# Patient Record
Sex: MALE | Race: BLACK OR AFRICAN AMERICAN | Employment: UNEMPLOYED | URBAN - METROPOLITAN AREA
[De-identification: names, ages, dates, MRNs, and addresses within clinical notes are randomized per-mention and may not be internally consistent; named-entity substitution may affect disease eponyms.]

---

## 2017-03-16 ENCOUNTER — HOSPITAL ENCOUNTER (EMERGENCY)
Age: 22
Discharge: HOME | End: 2017-03-16
Attending: EMERGENCY MEDICINE
Payer: MEDICAID

## 2017-03-16 ENCOUNTER — APPOINTMENT (OUTPATIENT)
Dept: CT IMAGING | Age: 22
End: 2017-03-16
Attending: EMERGENCY MEDICINE
Payer: MEDICAID

## 2017-03-16 VITALS
BODY MASS INDEX: 21.66 KG/M2 | WEIGHT: 110.89 LBS | TEMPERATURE: 97.9 F | SYSTOLIC BLOOD PRESSURE: 131 MMHG | OXYGEN SATURATION: 98 % | HEART RATE: 71 BPM | DIASTOLIC BLOOD PRESSURE: 89 MMHG | RESPIRATION RATE: 18 BRPM

## 2017-03-16 DIAGNOSIS — S00.83XA TRAUMATIC HEMATOMA OF FOREHEAD, INITIAL ENCOUNTER: Primary | ICD-10-CM

## 2017-03-16 DIAGNOSIS — S09.90XA MINOR HEAD INJURY, INITIAL ENCOUNTER: ICD-10-CM

## 2017-03-16 DIAGNOSIS — Q93.4 CRI-DU-CHAT SYNDROME: ICD-10-CM

## 2017-03-16 PROCEDURE — 74011250637 HC RX REV CODE- 250/637: Performed by: EMERGENCY MEDICINE

## 2017-03-16 PROCEDURE — 99283 EMERGENCY DEPT VISIT LOW MDM: CPT

## 2017-03-16 PROCEDURE — 70450 CT HEAD/BRAIN W/O DYE: CPT

## 2017-03-16 RX ORDER — ACETAMINOPHEN 160 MG/5ML
500 LIQUID ORAL
Qty: 1 BOTTLE | Refills: 0 | Status: SHIPPED | OUTPATIENT
Start: 2017-03-16 | End: 2017-05-08

## 2017-03-16 RX ORDER — ACETAMINOPHEN 325 MG/1
650 TABLET ORAL
Status: DISCONTINUED | OUTPATIENT
Start: 2017-03-16 | End: 2017-03-16

## 2017-03-16 RX ORDER — LORAZEPAM 2 MG/ML
1 INJECTION INTRAMUSCULAR
Status: DISCONTINUED | OUTPATIENT
Start: 2017-03-16 | End: 2017-03-16

## 2017-03-16 RX ADMIN — ACETAMINOPHEN 650 MG: 160 SOLUTION ORAL at 09:08

## 2017-03-16 NOTE — DISCHARGE INSTRUCTIONS
Your brother was seen after a head injury. After observation and imaging, we determined this is likely due to a concussion. Make sure to follow up with a pediatrician in 2-3 days to make sure improving. If any fevers, severe headaches, vomiting, changes in mental status, return to ER immediately.

## 2017-03-16 NOTE — ED NOTES
Pt ambulated to exam room with his sister, forehead noted to have swelling and redness, fell in bathtub yesterday hit head, rt eyebrow laceration. Sister says he's acting differently, not as active.

## 2017-03-16 NOTE — ED PROVIDER NOTES
HPI Comments: Georgia Holloway is a 25 y.o. male with PMHx significant for Cri-Du-Chat Syndrome who presents ambulatory to ED [facility] ED for evaluation of small hematoma after GLF. Sister states she was bathing him yesterday morning when he had a witnessed fall in the shower. She says pt fell face forward but saw no immediate trauma. Per sister pt developed a small \"bump\" on the left side of his face last night. Pt was not give Tylenol or ibuprofen for his symptoms. Pt's gaurdian denies any vomiting, diarrhea, fever, chills, or any other acute symptoms. PCP: Kathy Sandoval MD    Social Hx: - tobacco (-), -EtOH (-), - illicit drugs    There are no other complaints, changes or physical findings at this time. The history is provided by a relative. No  was used. 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 Social History Main Topics    Smoking status: Never Smoker    Smokeless tobacco: Never Used    Alcohol use No    Drug use: No    Sexual activity: No     Other Topics Concern    Not on file     Social History Narrative    ** Merged History Encounter **              ALLERGIES: Milk and Pork derived (porcine)    Review of Systems   Reason unable to perform ROS: Cri-du-chat syndrome.        Vitals:    03/16/17 0830   BP: 131/89   Pulse: 71   Resp: 18   Temp: 97.9 °F (36.6 °C)   SpO2: 98%   Weight: 50.3 kg (110 lb 14.3 oz)            Physical Exam Constitutional: He appears well-developed and well-nourished. Features of Cri-Du-Chat syndrome. Pt is ambulating without difficulty. HENT:   Head: Normocephalic and atraumatic. Hematoma slightly left to the midline of head, no periorbital swelling. Eyes: Conjunctivae and EOM are normal. Pupils are equal, round, and reactive to light. Neck: Normal range of motion. Neck supple. Cardiovascular: Normal rate and regular rhythm. Pulmonary/Chest: Effort normal and breath sounds normal. No respiratory distress. Abdominal: Soft. He exhibits no distension. There is no tenderness. Musculoskeletal: Normal range of motion. Neurological: He is alert. Pt is alert but not oriented per baseline. Moving all 4 extremities spontaneously, 5/5 strength bilateral upper and lower extremities. Skin: Skin is warm and dry. Nursing note and vitals reviewed. MDM  Number of Diagnoses or Management Options  Cri-du-chat syndrome:   Minor head injury, initial encounter:   Traumatic hematoma of forehead, initial encounter:   Diagnosis management comments: Patient was seen after a head injury with no loss of consciousness or amnestic events. DDx: contusion, concussion, traumatic bleed, skull fracture. Pt is unable to verbalize symptoms but is having a HA and changes in mental status per sister so will get CT head. Concern for possible fx       Amount and/or Complexity of Data Reviewed  Tests in the radiology section of CPT®: ordered and reviewed  Review and summarize past medical records: yes    Patient Progress  Patient progress: stable    ED Course       Procedures    IMAGING RESULTS:  CT HEAD WO CONT   Final Result   Study Result      EXAM: CT HEAD WO CONT     INDICATION: head injury     COMPARISON: None.     TECHNIQUE: Unenhanced CT of the head was performed using 5 mm images. Brain and  bone windows were generated.  CT dose reduction was achieved through use of a  standardized protocol tailored for this examination and automatic exposure  control for dose modulation.      FINDINGS:  Study is mildly compromised by motion. The ventricles and sulci are within  normal limits. No hemorrhage mass or acute infarction is identified. Bony  structures appear intact. .     IMPRESSION  IMPRESSION: No acute abnormality is identified            MEDICATIONS GIVEN:  Medications   acetaminophen (TYLENOL) solution 650 mg (650 mg Oral Given 3/16/17 0908)       IMPRESSION:  1. Traumatic hematoma of forehead, initial encounter    2. Minor head injury, initial encounter    3. Cri-du-chat syndrome        PLAN:  1. Discharge Medication List as of 3/16/2017 10:02 AM      START taking these medications    Details   acetaminophen (TYLENOL) 160 mg/5 mL liquid Take 15.6 mL by mouth every six (6) hours as needed for Pain., Normal, Disp-1 Bottle, R-0           2. Follow-up Information     Follow up With Details Comments Cameron Nj MD   [address] [place] [address]  [phone]          Return to ED if worse     DISCHARGE NOTE  10:08 AM  The patient has been re-evaluated and is ready for discharge. Reviewed available results with patient. Counseled pt on diagnosis and care plan. Pt has expressed understanding, and all questions have been answered. Pt agrees with plan and agrees to F/U as recommended, or return to the ED if their sxs worsen. Discharge instructions have been provided and explained to the pt, along with reasons to return to the ED. This note is prepared by José Miguel Choudhary acting as Scribe for Rodrick Feliciano M.D. Rodrick Feliciano M.D : The scribe's documentation has been prepared under my direction and personally reviewed by me in its entirety. I confirm that the note above accurately reflects all work, treatment, procedures, and medical decision making performed by me.

## 2017-03-21 ENCOUNTER — OFFICE VISIT (OUTPATIENT)
Dept: PEDIATRICS CLINIC | Age: 22
End: 2017-03-21

## 2017-03-21 VITALS
DIASTOLIC BLOOD PRESSURE: 84 MMHG | WEIGHT: 103.6 LBS | BODY MASS INDEX: 20.34 KG/M2 | HEIGHT: 60 IN | TEMPERATURE: 97 F | HEART RATE: 73 BPM | SYSTOLIC BLOOD PRESSURE: 113 MMHG

## 2017-03-21 DIAGNOSIS — L20.9 ATOPIC DERMATITIS, UNSPECIFIED TYPE: ICD-10-CM

## 2017-03-21 DIAGNOSIS — B35.0 TINEA CAPITIS: Primary | ICD-10-CM

## 2017-03-21 DIAGNOSIS — L01.00 IMPETIGO: ICD-10-CM

## 2017-03-21 RX ORDER — PETROLATUM 42 G/100G
OINTMENT TOPICAL AS NEEDED
Qty: 452 G | Refills: 2 | Status: SHIPPED | OUTPATIENT
Start: 2017-03-21 | End: 2017-05-08

## 2017-03-21 RX ORDER — FLUTICASONE PROPIONATE 0.5 MG/G
CREAM TOPICAL 2 TIMES DAILY
Qty: 30 G | Refills: 2 | Status: SHIPPED | OUTPATIENT
Start: 2017-03-21 | End: 2017-05-08

## 2017-03-21 RX ORDER — GRISEOFULVIN (MICROSIZE) 125 MG/5ML
250 SUSPENSION ORAL 2 TIMES DAILY
Qty: 400 ML | Refills: 0 | Status: SHIPPED | OUTPATIENT
Start: 2017-03-21 | End: 2017-04-10

## 2017-03-21 RX ORDER — MUPIROCIN 20 MG/G
OINTMENT TOPICAL 2 TIMES DAILY
Qty: 30 G | Refills: 1 | Status: SHIPPED | OUTPATIENT
Start: 2017-03-21 | End: 2017-03-31

## 2017-03-21 NOTE — MR AVS SNAPSHOT
Visit Information Date & Time Provider Department Dept. Phone Encounter #  
 3/21/2017 11:00 AM MELISA Aaronkarmen 14 [id number] Upcoming Health Maintenance Date Due Hepatitis A Peds Age 1-18 (1 of 2 - Standard Series) 6/6/1998 HPV AGE 9Y-26Y (1 of 3 - Male 3 Dose Series) 6/6/2008 INFLUENZA AGE 9 TO ADULT 8/1/2016 DTaP/Tdap/Td series (5 - Td) 2/17/2017 Allergies as of 3/21/2017  Review Complete On: 3/21/2017 By: Bert Harrison MD  
  
 Severity Noted Reaction Type Reactions Milk  06/17/2015    Other (comments) Mother states that he has a reaction to dairy, diarrhea, gas, and vomiting. Pork Derived (Porcine)  06/17/2015    Other (comments) Mother states that he has a reaction to pork including diarrhea, gas, and vomiting. Current Immunizations  Reviewed on 6/19/2015 Name Date DTaP 7/15/1998, 1/15/1998, 1997 Hep B Vaccine 3/11/1998, 1997, 1997 Hib 11/15/1998, 3/19/1998, 1/15/1998, 1997 MMR 12/29/2003, 6/25/1998 Meningococcal (MCV4P) Vaccine 6/19/2015 Poliovirus vaccine 2/19/2009, 3/19/1998, 1/5/1998, 1997 Tdap 2/17/2007 Varicella Virus Vaccine 11/15/1998 Not reviewed this visit You Were Diagnosed With   
  
 Codes Comments Tinea capitis    -  Primary ICD-10-CM: B35.0 ICD-9-CM: 110.0 Impetigo     ICD-10-CM: L01.00 ICD-9-CM: 575 Atopic dermatitis, unspecified type     ICD-10-CM: L20.9 ICD-9-CM: 691.8 Vitals BP Pulse Temp Height(growth percentile) 113/84 (34 %/ 75 %)* (BP 1 Location: Left arm, BP Patient Position: Sitting) 73 97 °F (36.1 °C) (Tympanic) 5' (1.524 m) (<1 %, Z= -3.38) Weight(growth percentile) BMI Smoking Status 103 lb 9.6 oz (47 kg) (<1 %, Z= -3.05) 20.23 kg/m2 (15 %, Z= -1.04) Never Smoker *BP percentiles are based on NHBPEP's 4th Report Growth percentiles are based on CDC 2-20 Years data. BMI and BSA Data Body Mass Index Body Surface Area  
 20.23 kg/m 2 1.41 m 2 Preferred Pharmacy Pharmacy Name Phone [facility] #[id number]- [place], [address] Sandi Suh [phone] Your Updated Medication List  
  
   
This list is accurate as of: 3/21/17 11:56 AM.  Always use your most recent med list.  
  
  
  
  
 acetaminophen 160 mg/5 mL liquid Commonly known as:  TYLENOL Take 15.6 mL by mouth every six (6) hours as needed for Pain. fluticasone 0.05 % topical cream  
Commonly known as:  Thressa Economy Apply  to affected area two (2) times a day. To very dry areas at wrists, hands  
  
 griseofulvin microsize 125 mg/5 mL suspension Commonly known as:  Roxanne Corey Take 10 mL by mouth two (2) times a day for 20 days. mineral oil-hydrophil petrolat ointment Commonly known as:  AQUAPHOR Apply  to affected area as needed for Dry Skin. (can apply liberally, as needed anywhere, for dry skin)  
  
 mupirocin 2 % ointment Commonly known as:  [facility] Apply  to affected area two (2) times a day for 10 days. (to scabbed lesions at forehead) Prescriptions Sent to Pharmacy Refills  
 griseofulvin microsize (GRIFULVIN V) 125 mg/5 mL suspension 0 Sig: Take 10 mL by mouth two (2) times a day for 20 days. Class: Normal  
 Pharmacy: [facility] #: [phone] Route: Oral  
 mupirocin (BACTROBAN) 2 % ointment 1 Sig: Apply  to affected area two (2) times a day for 10 days. (to scabbed lesions at forehead) Class: Normal  
 Pharmacy: [facility] #: [phone] Route: Topical  
 fluticasone (CUTIVATE) 0.05 % topical cream 2 Sig: Apply  to affected area two (2) times a day. To very dry areas at wrists, hands Class: Normal  
 Pharmacy: [facility] #: [phone]  Route: Topical  
 mineral oil-hydrophil petrolat (AQUAPHOR) ointment 2 Sig: Apply  to affected area as needed for Dry Skin. (can apply liberally, as needed anywhere, for dry skin) Class: Normal  
 Pharmacy: [facility]/pharmacy [address] [place] [address] #: [phone] Route: Topical  
  
Patient Instructions Griseofulvin - 10 ml TWICE DAILY x 20 DAYS (for ringworm at scalp) Mupirocin Ointment - thin layer TWICE DAILY x 10 DAYS (to scabbed lesions at forehead) Cutivate (Fluticasone) Cream - thin layer TWICE DAILY, AS NEEDED, for dry, leathery skin at wrists, hands Aquaphor Moisturizer - can be used liberally anywhere on the body, for dry skin Ringworm of the Scalp: Care Instructions Your Care Instructions Ringworm is a fungus infection of the skin. It is not caused by a worm or bug. Ringworm causes round patches of baldness or scaly skin on the scalp. Ringworm of the scalp is most common in children 1to 5years old. Sometimes a blister-like rash appears on the face with ringworm of the scalp. This is an allergic reaction that usually clears when the ringworm is treated. The fungus that causes ringworm of the scalp spreads from person to person. You can catch ringworm by sharing hats, jansen, brushes, towels, telephones, or sports equipment. You can also get it by touching a person with ringworm. Once in a while, it can also spread from a dog or cat to a person. Ringworm of the scalp is treated with pills. Ringworm may come back after treatment. Treating ringworm of the scalp can prevent scarring and permanent hair loss. Follow-up care is a key part of your treatment and safety. Be sure to make and go to all appointments, and call your doctor if you are having problems. It's also a good idea to know your test results and keep a list of the medicines you take. How can you care for yourself at home? · Take your medicines exactly as prescribed.  Call your doctor if you have any problems with your medicine. · Ask your doctor if a shampoo might help. Special shampoos for ringworm contain selenium sulfide or ketoconazole. Your doctor can let you know if and how often you can use one. · To prevent spreading ringworm: ¨ As soon as you start treatment, throw away your jansen and brushes, and buy new ones. Do not share hats, sport equipment, or other objects. Ringworm-causing fungus can live on objects, people, or animals for several months. ¨ Wash your hands well after caring for someone with ringworm. Adults who have contact with a child with ringworm of the scalp can become a carrier. A carrier does not have a ringworm infection but can pass ringworm to others. ¨ Wash your clothes, towels, and bed sheets in hot, soapy water. When should you call for help? Call your doctor now or seek immediate medical care if: · The rash appears to be spreading, even after treatment. · You have signs of infection such as: 
¨ Increased pain, swelling, redness, tenderness, or heat. ¨ Red streaks extending from the area. ¨ Pus coming from the rash on your skin. ¨ A fever. Watch closely for changes in your health, and be sure to contact your doctor if: 
· Your ringworm does not improve after 2 weeks of treatment. · You have hair loss that occurs in patches. · You do not get better as expected. Where can you learn more? Go to http://kenia-halima.info/. Enter 6015 8727 in the search box to learn more about \"Ringworm of the Scalp: Care Instructions. \" Current as of: August 4, 2016 Content Version: 11.1 © 2227-0255 [employer]. Care instructions adapted under license by [employer] (which disclaims liability or warranty for this information). If you have questions about a medical condition or this instruction, always ask your healthcare professional. [address] any warranty or liability for your use of this information. Atopic Dermatitis in Children: Care Instructions Your Care Instructions Atopic dermatitis (also called eczema) is a skin problem that causes intense itching and a red, raised rash. The rash may have tiny blisters, which break and crust over. Children with this condition seem to have very sensitive immune systems that are likely to react to things that cause allergies. The immune system is the body's way of fighting infection. Children who have atopic dermatitis often have asthma or hay fever and other allergies, including food allergies. There is no cure for atopic dermatitis, but you may be able to control it. Some children may outgrow the condition. Follow-up care is a key part of your child's treatment and safety. Be sure to make and go to all appointments, and call your doctor if your child is having problems. It's also a good idea to know your child's test results and keep a list of the medicines your child takes. How can you care for your child at home? · Use moisturizer at least twice a day. · If your doctor prescribes a cream, use it as directed. If your doctor prescribes other medicine, give it exactly as directed. · Have your child bathe in warm (not hot) water. Do not use bath oils. Limit baths to 5 minutes. · Do not use soap at every bath. When you do need soap, use a gentle, nondrying cleanser such as Aveeno, Basis, Dove, or Neutrogena. · Apply a moisturizer after bathing. Use a cream such as Lubriderm, Moisturel, or Cetaphil that does not irritate the skin or cause a rash. Apply the cream while your child's skin is still damp after lightly drying with a towel. · Place cold, wet cloths on the rash to help with itching. · Keep your child's fingernails trimmed and filed smooth to help prevent scratching. Wearing mittens or cotton socks on the hands may help keep your child from scratching the rash. · Wash clothes and bedding in mild detergent.  Use an unscented fabric softener. Choose soft clothing and bedding. · For a very itchy rash, ask your doctor before you give your child an over-the-counter antihistamine such as Benadryl or Claritin. It helps relieve itching in some children. In others, it has little or no effect. Read and follow all instructions on the label. When should you call for help? Call your doctor now or seek immediate medical care if: 
· Your child has a rash and a fever. · Your child has new blisters or bruises, or a rash spreads and looks like a sunburn. · Your child has crusting or oozing sores. · Your child has joint aches or body aches with a rash. · Your child has signs of infection. These include: 
¨ Increased pain, swelling, redness, or warmth around the rash. ¨ Red streaks leading from the rash. ¨ Pus draining from the rash. ¨ A fever. Watch closely for changes in your child's health, and be sure to contact your doctor if: · A rash does not clear up after 2 to 3 weeks of home treatment. · You cannot control your child's itching. · Your child has problems with the medicine. Where can you learn more? Go to http://kenia-halima.info/. Enter V303 in the search box to learn more about \"Atopic Dermatitis in Children: Care Instructions. \" Current as of: February 5, 2016 Content Version: 11.1 © 3140-1342 [employer]. Care instructions adapted under license by [employer] (which disclaims liability or warranty for this information). If you have questions about a medical condition or this instruction, always ask your healthcare professional. [address] any warranty or liability for your use of this information. Impetigo: Care Instructions Your Care Instructions Impetigo (say \"zv-pig-CD-go\") is a skin infection caused by bacteria. It causes blisters that break and become oozing, yellow, crusty sores. Impetigo can be anywhere on the body. Scratching the sores may spread the infection to other parts of the body. You can also spread it to others through close contact or when you share towels, clothing, and other items. Prescription antibiotic ointment or pills can usually cure impetigo. (After a day of antibiotics, the infection should not spread.) Follow-up care is a key part of your treatment and safety. Be sure to make and go to all appointments, and call your doctor if you are having problems. It's also a good idea to know your test results and keep a list of the medicines you take. How can you care for yourself at home? · Apply antibiotic ointment exactly as instructed. · If your doctor prescribed antibiotic pills, take them as directed. Do not stop using them just because you feel better. You need to take the full course of antibiotics. · Gently wash the sores with soap and water each day. If crusts form, your doctor may advise you to soften or remove the crusts. You can do this by soaking them in warm water and patting them dry. This can help the cream or ointment treat impetigo. · After you touch the area, wash your hands with soap and water. Or you can use an alcohol-based hand . · Don't share items such as towels, sheets, and clothing until the infection is gone. · Wash anything that may have touched the infected area. · Try to avoid scratching the area. When should you call for help? Call your doctor now or seek immediate medical care if: 
· You have symptoms of a worse infection, such as: 
¨ Increased pain, swelling, warmth, or redness. ¨ Red streaks leading from the area. ¨ Pus draining from the area. ¨ A fever. · Impetigo gets worse or spreads to other areas. Watch closely for changes in your health, and be sure to contact your doctor if: 
· You do not get better as expected. Where can you learn more? Go to http://kenia-halima.info/. Enter B805 in the search box to learn more about \"Impetigo: Care Instructions. \" Current as of: July 26, 2016 Content Version: 11.1 © 1719-1940 [employer]. Care instructions adapted under license by [employer] (which disclaims liability or warranty for this information). If you have questions about a medical condition or this instruction, always ask your healthcare professional. [address] any warranty or liability for your use of this information. Introducing \[facility] [place] [facility]\"" & HEALTH SERVICES! Bethany Jernigan introduces EverTune patient portal. Now you can access parts of your medical record, email your doctor's office, and request medication refills online. 1. In your internet browser, go to [email or web address] 2. Click on the First Time User? Click Here link in the Sign In box. You will see the New Member Sign Up page. 3. Enter your EverTune Access Code exactly as it appears below. You will not need to use this code after youve completed the sign-up process. If you do not sign up before the expiration date, you must request a new code. · EverTune Access Code: [email or web address] Expires: 6/14/2017  9:03 AM 
 
4. Enter the last four digits of your Social Security Number (xxxx) and Date of Birth (mm/dd/yyyy) as indicated and click Submit. You will be taken to the next sign-up page. 5. Create a EverTune ID. This will be your EverTune login ID and cannot be changed, so think of one that is secure and easy to remember. 6. Create a EverTune password. You can change your password at any time. 7. Enter your Password Reset Question and Answer. This can be used at a later time if you forget your password. 8. Enter your e-mail address. You will receive e-mail notification when new information is available in [address] 9. Click Sign Up. You can now view and download portions of your medical record. 10. Click the Download Summary menu link to download a portable copy of your medical information. If you have questions, please visit the Frequently Asked Questions section of the esolidar website. Remember, esolidar is NOT to be used for urgent needs. For medical emergencies, dial 911. Now available from your iPhone and Android! Please provide this summary of care documentation to your next provider. Your primary care clinician is listed as Santana Peterson. If you have any questions after today's visit, please call [phone].

## 2017-03-21 NOTE — PROGRESS NOTES
HISTORY OF PRESENT ILLNESS  Sussy Petersen is a 25 y.o. male. HPI  25 yr old male with PMH sig for Cri-Du-Chat Synd, MR, here today with his sister (legal guardian) for rashes at face and arms / hands. He was treated 11/15 for ringworm, his sister feels it has recurred at his scalp, and now also has lesions extending down to his forehead. He also has a hx of eczema, she is out of his Rx cream, and he has flare-ups at his wrists and hands. Review of Systems   Skin: Positive for itching and rash. Physical Exam   Skin:   Fine papules noted at the scalp, extending down to forehead. He also has a few excoriated, scabbed, annular lesions at his forehead. Extensor surface of wrists is hyperpigmented, leathery, extremely dry. ASSESSMENT and PLAN    ICD-10-CM ICD-9-CM    1. Tinea capitis B35.0 110.0 griseofulvin microsize (GRIFULVIN V) 125 mg/5 mL suspension   2. Impetigo L01.00 684 mupirocin (BACTROBAN) 2 % ointment   3.  Atopic dermatitis, unspecified type L20.9 691.8 fluticasone (CUTIVATE) 0.05 % topical cream      mineral oil-hydrophil petrolat (AQUAPHOR) ointment     Griseofulvin - 10 ml TWICE DAILY x 20 DAYS (for ringworm at scalp)    Mupirocin Ointment - thin layer TWICE DAILY x 10 DAYS (to scabbed lesions at forehead)    Cutivate (Fluticasone) Cream - thin layer TWICE DAILY, AS NEEDED, for dry, leathery skin at wrists, hands    Aquaphor Moisturizer - can be used liberally anywhere on the body, for dry skin

## 2017-03-21 NOTE — PATIENT INSTRUCTIONS
Griseofulvin - 10 ml TWICE DAILY x 20 DAYS (for ringworm at scalp)    Mupirocin Ointment - thin layer TWICE DAILY x 10 DAYS (to scabbed lesions at forehead)    Cutivate (Fluticasone) Cream - thin layer TWICE DAILY, AS NEEDED, for dry, leathery skin at wrists, hands    Aquaphor Moisturizer - can be used liberally anywhere on the body, for dry skin         Ringworm of the Scalp: Care Instructions  Your Care Instructions  Ringworm is a fungus infection of the skin. It is not caused by a worm or bug. Ringworm causes round patches of baldness or scaly skin on the scalp. Ringworm of the scalp is most common in children 1to 5years old. Sometimes a blister-like rash appears on the face with ringworm of the scalp. This is an allergic reaction that usually clears when the ringworm is treated. The fungus that causes ringworm of the scalp spreads from person to person. You can catch ringworm by sharing hats, jansen, brushes, towels, telephones, or sports equipment. You can also get it by touching a person with ringworm. Once in a while, it can also spread from a dog or cat to a person. Ringworm of the scalp is treated with pills. Ringworm may come back after treatment. Treating ringworm of the scalp can prevent scarring and permanent hair loss. Follow-up care is a key part of your treatment and safety. Be sure to make and go to all appointments, and call your doctor if you are having problems. It's also a good idea to know your test results and keep a list of the medicines you take. How can you care for yourself at home? · Take your medicines exactly as prescribed. Call your doctor if you have any problems with your medicine. · Ask your doctor if a shampoo might help. Special shampoos for ringworm contain selenium sulfide or ketoconazole. Your doctor can let you know if and how often you can use one. · To prevent spreading ringworm:  ¨ As soon as you start treatment, throw away your jansen and brushes, and buy new ones. Do not share hats, sport equipment, or other objects. Ringworm-causing fungus can live on objects, people, or animals for several months. ¨ Wash your hands well after caring for someone with ringworm. Adults who have contact with a child with ringworm of the scalp can become a carrier. A carrier does not have a ringworm infection but can pass ringworm to others. ¨ Wash your clothes, towels, and bed sheets in hot, soapy water. When should you call for help? Call your doctor now or seek immediate medical care if:  · The rash appears to be spreading, even after treatment. · You have signs of infection such as:  ¨ Increased pain, swelling, redness, tenderness, or heat. ¨ Red streaks extending from the area. ¨ Pus coming from the rash on your skin. ¨ A fever. Watch closely for changes in your health, and be sure to contact your doctor if:  · Your ringworm does not improve after 2 weeks of treatment. · You have hair loss that occurs in patches. · You do not get better as expected. Where can you learn more? Go to http://kenia-halima.info/. Enter 9615 9032 in the search box to learn more about \"Ringworm of the Scalp: Care Instructions. \"  Current as of: August 4, 2016  Content Version: 11.1  © 1679-9732 [employer]. Care instructions adapted under license by [employer] (which disclaims liability or warranty for this information). If you have questions about a medical condition or this instruction, always ask your healthcare professional. [place] [address] any warranty or liability for your use of this information. Atopic Dermatitis in Children: Care Instructions  Your Care Instructions  Atopic dermatitis (also called eczema) is a skin problem that causes intense itching and a red, raised rash. The rash may have tiny blisters, which break and crust over.  Children with this condition seem to have very sensitive immune systems that are likely to react to things that cause allergies. The immune system is the body's way of fighting infection. Children who have atopic dermatitis often have asthma or hay fever and other allergies, including food allergies. There is no cure for atopic dermatitis, but you may be able to control it. Some children may outgrow the condition. Follow-up care is a key part of your child's treatment and safety. Be sure to make and go to all appointments, and call your doctor if your child is having problems. It's also a good idea to know your child's test results and keep a list of the medicines your child takes. How can you care for your child at home? · Use moisturizer at least twice a day. · If your doctor prescribes a cream, use it as directed. If your doctor prescribes other medicine, give it exactly as directed. · Have your child bathe in warm (not hot) water. Do not use bath oils. Limit baths to 5 minutes. · Do not use soap at every bath. When you do need soap, use a gentle, nondrying cleanser such as Aveeno, Basis, Dove, or Neutrogena. · Apply a moisturizer after bathing. Use a cream such as Lubriderm, Moisturel, or Cetaphil that does not irritate the skin or cause a rash. Apply the cream while your child's skin is still damp after lightly drying with a towel. · Place cold, wet cloths on the rash to help with itching. · Keep your child's fingernails trimmed and filed smooth to help prevent scratching. Wearing mittens or cotton socks on the hands may help keep your child from scratching the rash. · Wash clothes and bedding in mild detergent. Use an unscented fabric softener. Choose soft clothing and bedding. · For a very itchy rash, ask your doctor before you give your child an over-the-counter antihistamine such as Benadryl or Claritin. It helps relieve itching in some children. In others, it has little or no effect. Read and follow all instructions on the label. When should you call for help?   Call your doctor now or seek immediate medical care if:  · Your child has a rash and a fever. · Your child has new blisters or bruises, or a rash spreads and looks like a sunburn. · Your child has crusting or oozing sores. · Your child has joint aches or body aches with a rash. · Your child has signs of infection. These include:  ¨ Increased pain, swelling, redness, or warmth around the rash. ¨ Red streaks leading from the rash. ¨ Pus draining from the rash. ¨ A fever. Watch closely for changes in your child's health, and be sure to contact your doctor if:  · A rash does not clear up after 2 to 3 weeks of home treatment. · You cannot control your child's itching. · Your child has problems with the medicine. Where can you learn more? Go to http://kenia-halima.info/. Enter V303 in the search box to learn more about \"Atopic Dermatitis in Children: Care Instructions. \"  Current as of: February 5, 2016  Content Version: 11.1  © 6234-7424 [employer]. Care instructions adapted under license by [employer] (which disclaims liability or warranty for this information). If you have questions about a medical condition or this instruction, always ask your healthcare professional. [place] [address] any warranty or liability for your use of this information. Impetigo: Care Instructions  Your Care Instructions  Impetigo (say \"kt-fsy-TQ-go\") is a skin infection caused by bacteria. It causes blisters that break and become oozing, yellow, crusty sores. Impetigo can be anywhere on the body. Scratching the sores may spread the infection to other parts of the body. You can also spread it to others through close contact or when you share towels, clothing, and other items. Prescription antibiotic ointment or pills can usually cure impetigo. (After a day of antibiotics, the infection should not spread.)  Follow-up care is a key part of your treatment and safety.  Be sure to make and go to all appointments, and call your doctor if you are having problems. It's also a good idea to know your test results and keep a list of the medicines you take. How can you care for yourself at home? · Apply antibiotic ointment exactly as instructed. · If your doctor prescribed antibiotic pills, take them as directed. Do not stop using them just because you feel better. You need to take the full course of antibiotics. · Gently wash the sores with soap and water each day. If crusts form, your doctor may advise you to soften or remove the crusts. You can do this by soaking them in warm water and patting them dry. This can help the cream or ointment treat impetigo. · After you touch the area, wash your hands with soap and water. Or you can use an alcohol-based hand . · Don't share items such as towels, sheets, and clothing until the infection is gone. · Wash anything that may have touched the infected area. · Try to avoid scratching the area. When should you call for help? Call your doctor now or seek immediate medical care if:  · You have symptoms of a worse infection, such as:  ¨ Increased pain, swelling, warmth, or redness. ¨ Red streaks leading from the area. ¨ Pus draining from the area. ¨ A fever. · Impetigo gets worse or spreads to other areas. Watch closely for changes in your health, and be sure to contact your doctor if:  · You do not get better as expected. Where can you learn more? Go to http://kenia-halima.info/. Enter M926 in the search box to learn more about \"Impetigo: Care Instructions. \"  Current as of: July 26, 2016  Content Version: 11.1  © 6779-0364 [employer]. Care instructions adapted under license by [employer] (which disclaims liability or warranty for this information).  If you have questions about a medical condition or this instruction, always ask your healthcare professional. Andriy Schneider any warranty or liability for your use of this information.

## 2017-05-08 ENCOUNTER — HOSPITAL ENCOUNTER (EMERGENCY)
Age: 22
Discharge: HOME | End: 2017-05-08
Attending: PEDIATRICS
Payer: MEDICAID

## 2017-05-08 ENCOUNTER — HOSPITAL ENCOUNTER (EMERGENCY)
Age: 22
Discharge: ANOTHER HOSPITAL | End: 2017-05-08
Attending: EMERGENCY MEDICINE
Payer: MEDICAID

## 2017-05-08 VITALS
SYSTOLIC BLOOD PRESSURE: 113 MMHG | TEMPERATURE: 97.7 F | RESPIRATION RATE: 20 BRPM | DIASTOLIC BLOOD PRESSURE: 68 MMHG | WEIGHT: 112.88 LBS | BODY MASS INDEX: 22.04 KG/M2 | OXYGEN SATURATION: 100 % | HEART RATE: 90 BPM

## 2017-05-08 VITALS
DIASTOLIC BLOOD PRESSURE: 67 MMHG | OXYGEN SATURATION: 100 % | HEART RATE: 64 BPM | WEIGHT: 137 LBS | SYSTOLIC BLOOD PRESSURE: 131 MMHG | BODY MASS INDEX: 26.9 KG/M2 | RESPIRATION RATE: 16 BRPM | TEMPERATURE: 98.4 F | HEIGHT: 60 IN
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
## 2017-05-08 DIAGNOSIS — Z04.41 ENCOUNTER FOR EVALUATION OF SEXUAL ABUSE IN ADULT: Primary | ICD-10-CM

## 2017-05-08 PROCEDURE — 75810000275 HC EMERGENCY DEPT VISIT NO LEVEL OF CARE

## 2017-05-08 PROCEDURE — 99284 EMERGENCY DEPT VISIT MOD MDM: CPT

## 2017-05-08 PROCEDURE — 99283 EMERGENCY DEPT VISIT LOW MDM: CPT
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
## 2017-05-08 NOTE — ED NOTES
Emergency Department Nursing Plan of Care       The Nursing Plan of Care is developed from the Nursing assessment and Emergency Department Attending provider initial evaluation. The plan of care may be reviewed in the ED Provider note.     The Plan of Care was developed with the following considerations:   Patient / Family readiness to learn indicated by:verbalized understanding  Persons(s) to be included in education: family  Barriers to Learning/Limitations:speech    Signed     Olesya Torrez RN    5/8/2017   10:43 AM
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
## 2017-06-19 ENCOUNTER — TELEPHONE (OUTPATIENT)
Dept: PEDIATRICS CLINIC | Age: 22
End: 2017-06-19

## 2017-06-19 NOTE — TELEPHONE ENCOUNTER
Mother stated adult services asked her to speak with Dr. Edgar Alvarado about patient qualifying for the EPSDT program. Would like call back at [phone]

## 2017-06-21 NOTE — TELEPHONE ENCOUNTER
Pt mom is calling back to see if Hetal Ford qualifies for the EPSDT program. Please call mom back with the answer at [phone]

## 2017-06-22 NOTE — TELEPHONE ENCOUNTER
Mom called back-wanted to know status advised Mom Dr. Grace Campoverde has forwarded to [facility] Cristel Gibbs for help with determining/access for program.  Mom was advised message sent to NN this afternoon, if she had not heard from NN in 48 hours to call back and let me know so I could f/u.

## 2017-06-26 ENCOUNTER — PATIENT OUTREACH (OUTPATIENT)
Dept: PEDIATRICS CLINIC | Age: 22
End: 2017-06-26

## 2017-06-27 ENCOUNTER — PATIENT OUTREACH (OUTPATIENT)
Dept: PEDIATRICS CLINIC | Age: 22
End: 2017-06-27

## 2017-07-06 ENCOUNTER — PATIENT OUTREACH (OUTPATIENT)
Dept: PEDIATRICS CLINIC | Age: 22
End: 2017-07-06

## 2017-07-06 NOTE — PROGRESS NOTES
Telephoned patient's sister and Migdalia Pallas as a follow up. The phone rang for some time and then stopped without any message or option to leave a voice message. Updated Dr. Dayo Millard. Explained NN available to assist as needed.

## 2017-07-06 NOTE — PROGRESS NOTES
MELISA Adamsma 40 @ [phone]. Transferred to [place] [id number] [phone]. Ms. Denton Kearney screens patients for Development Delayed Waiver eligibility. She does work with adult patients and patient would be considered in adult. Ms. Denton Kearney was not familiar with the program the patient's sister had referenced. Ms. Denton Kearney did have information regarding EPSDT she would send but was not sure how to activate the personal care for patient program.    Telephoned patient's sister, Raz Castellon @ [phone]. No answer. Left message requesting a return phone call to provide additional information.

## 2017-09-07 ENCOUNTER — TELEPHONE (OUTPATIENT)
Dept: PEDIATRICS CLINIC | Age: 22
End: 2017-09-07
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
## 2017-09-08 ENCOUNTER — LAB ONLY (OUTPATIENT)
Dept: PEDIATRICS CLINIC | Age: 22
End: 2017-09-08

## 2017-09-08 DIAGNOSIS — Z01.89 PATIENT REQUEST FOR DIAGNOSTIC TESTING: Primary | ICD-10-CM

## 2017-09-08 LAB — LEAD LEVEL, POCT: <3.3 NG/DL

## 2017-10-06 ENCOUNTER — OFFICE VISIT (OUTPATIENT)
Dept: PEDIATRICS CLINIC | Age: 22
End: 2017-10-06

## 2017-10-06 VITALS — WEIGHT: 103.4 LBS | HEIGHT: 60 IN | TEMPERATURE: 96.4 F | BODY MASS INDEX: 20.3 KG/M2

## 2017-10-06 DIAGNOSIS — Z23 ENCOUNTER FOR IMMUNIZATION: Primary | ICD-10-CM

## 2017-10-06 RX ORDER — FLUTICASONE PROPIONATE 0.5 MG/G
CREAM TOPICAL 2 TIMES DAILY
Qty: 30 G | Refills: 2 | Status: SHIPPED | OUTPATIENT
Start: 2017-10-06 | End: 2018-02-10 | Stop reason: CLARIF

## 2017-10-06 NOTE — MR AVS SNAPSHOT
Visit Information Date & Time Provider Department Dept. Phone Encounter #  
 10/6/2017 10:00 AM MELISA Nicholas [facility] 14 [id number] Follow-up Instructions Return for NURSE-ONLY visit to have lab work done (including H.pylori). Upcoming Health Maintenance Date Due Hepatitis A Peds Age 1-18 (1 of 2 - Standard Series) 6/6/1998 HPV AGE 9Y-26Y (1 of 3 - Male 3 Dose Series) 6/6/2008 DTaP/Tdap/Td series (5 - Td) 2/17/2017 INFLUENZA AGE 9 TO ADULT 8/1/2017 Allergies as of 10/6/2017  Review Complete On: 10/6/2017 By: Elias Cope MD  
  
 Severity Noted Reaction Type Reactions Milk  06/17/2015    Other (comments) Mother states that he has a reaction to dairy, diarrhea, gas, and vomiting. Pork Derived (Porcine)  06/17/2015    Other (comments) Mother states that he has a reaction to pork including diarrhea, gas, and vomiting. Tomato  05/08/2017    Other (comments) Intolerance, stomach upset Current Immunizations  Reviewed on 6/19/2015 Name Date DTaP 7/15/1998, 1/15/1998, 1997 Hep A Vaccine (Adult)  Incomplete Hep B Vaccine 3/11/1998, 1997, 1997 Hib 11/15/1998, 3/19/1998, 1/15/1998, 1997 MMR 12/29/2003, 6/25/1998 Meningococcal (MCV4O) Vaccine  Incomplete Meningococcal (MCV4P) Vaccine 6/19/2015 Meningococcal B (OMV) Vaccine  Incomplete Poliovirus vaccine 2/19/2009, 3/19/1998, 1/5/1998, 1997 Tdap 2/17/2007 Varicella Virus Vaccine 11/15/1998 Not reviewed this visit You Were Diagnosed With   
  
 Codes Comments Encounter for immunization    -  Primary ICD-10-CM: D93 ICD-9-CM: V03.89 Vitals Temp Height(growth percentile) Weight(growth percentile) BMI Smoking Status 96.4 °F (35.8 °C) (Axillary) 5' (1.524 m) 103 lb 6.4 oz (46.9 kg) 20.19 kg/m2 Never Smoker Vitals History BMI and BSA Data Body Mass Index Body Surface Area 20.19 kg/m 2 1.41 m 2 Preferred Pharmacy Pharmacy Name Phone [facility] #[id number]- [place], [address] [phone] Your Updated Medication List  
  
   
This list is accurate as of: 10/6/17 11:16 AM.  Always use your most recent med list.  
  
  
  
  
 varicella virus vaccine (live) 1,350 unit/0.5 mL injection Commonly known as:  VARIVAX (PF)  
0.5 mL by SubCUTAneous route once for 1 dose. Prescriptions Sent to Pharmacy Refills  
 varicella virus vaccine, live, (VARIVAX, PF,) 1,350 unit/0.5 mL injection 0 Si.5 mL by SubCUTAneous route once for 1 dose. Class: Normal  
 Pharmacy: [facility]/pharmacy [address] [place] [address] Ph #: [phone] Route: SubCUTAneous We Performed the Following HEPATITIS A VACCINE, ADULT DOSAGE, IM [83128 CPT(R)] MENINGOCOCCAL (MENVEO) CONJUGATE VACCINE, SEROGROUPS A, C, Y AND W-135 (TETRAVALENT), IM [id number] CPT(R)] MENINGOCOCCAL B (BEXSERO) RECOMBINANT PROT W/OUT MEMBR VESIC VACC IM [id number] CPT(R)] Follow-up Instructions Return for NURSE-ONLY visit to have lab work done (including H.pylori). Patient Instructions Well Visit, Ages 25 to 48: Care Instructions Your Care Instructions Physical exams can help you stay healthy. Your doctor has checked your overall health and may have suggested ways to take good care of yourself. He or she also may have recommended tests. At home, you can help prevent illness with healthy eating, regular exercise, and other steps. Follow-up care is a key part of your treatment and safety. Be sure to make and go to all appointments, and call your doctor if you are having problems. It's also a good idea to know your test results and keep a list of the medicines you take. How can you care for yourself at home? · Reach and stay at a healthy weight.  This will lower your risk for many problems, such as obesity, diabetes, heart disease, and high blood pressure. · Get at least 30 minutes of physical activity on most days of the week. Walking is a good choice. You also may want to do other activities, such as running, swimming, cycling, or playing tennis or team sports. Discuss any changes in your exercise program with your doctor. · Do not smoke or allow others to smoke around you. If you need help quitting, talk to your doctor about stop-smoking programs and medicines. These can increase your chances of quitting for good. · Talk to your doctor about whether you have any risk factors for sexually transmitted infections (STIs). Having one sex partner (who does not have STIs and does not have sex with anyone else) is a good way to avoid these infections. · Use birth control if you do not want to have children at this time. Talk with your doctor about the choices available and what might be best for you. · Protect your skin from too much sun. When you're outdoors from 10 a.m. to 4 p.m., stay in the shade or cover up with clothing and a hat with a wide brim. Wear sunglasses that block UV rays. Even when it's cloudy, put broad-spectrum sunscreen (SPF 30 or higher) on any exposed skin. · See a dentist one or two times a year for checkups and to have your teeth cleaned. · Wear a seat belt in the car. · Drink alcohol in moderation, if at all. That means no more than 2 drinks a day for men and 1 drink a day for women. Follow your doctor's advice about when to have certain tests. These tests can spot problems early. For everyone · Cholesterol. Have the fat (cholesterol) in your blood tested after age 21. Your doctor will tell you how often to have this done based on your age, family history, or other things that can increase your risk for heart disease. · Blood pressure.  Have your blood pressure checked during a routine doctor visit. Your doctor will tell you how often to check your blood pressure based on your age, your blood pressure results, and other factors. · Vision. Talk with your doctor about how often to have a glaucoma test. 
· Diabetes. Ask your doctor whether you should have tests for diabetes. · Colon cancer. Have a test for colon cancer at age 48. You may have one of several tests. If you are younger than 48, you may need a test earlier if you have any risk factors. Risk factors include whether you already had a precancerous polyp removed from your colon or whether your parent, brother, sister, or child has had colon cancer. For women · Breast exam and mammogram. Talk to your doctor about when you should have a clinical breast exam and a mammogram. Medical experts differ on whether and how often women under 50 should have these tests. Your doctor can help you decide what is right for you. · Pap test and pelvic exam. Begin Pap tests at age 24. A Pap test is the best way to find cervical cancer. The test often is part of a pelvic exam. Ask how often to have this test. 
· Tests for sexually transmitted infections (STIs). Ask whether you should have tests for STIs. You may be at risk if you have sex with more than one person, especially if your partners do not wear condoms. For men · Tests for sexually transmitted infections (STIs). Ask whether you should have tests for STIs. You may be at risk if you have sex with more than one person, especially if you do not wear a condom. · Testicular cancer exam. Ask your doctor whether you should check your testicles regularly. · Prostate exam. Talk to your doctor about whether you should have a blood test (called a PSA test) for prostate cancer. Experts differ on whether and when men should have this test. Some experts suggest it if you are older than 39 and are -American or have a father or brother who got prostate cancer when he was younger than 72. When should you call for help? Watch closely for changes in your health, and be sure to contact your doctor if you have any problems or symptoms that concern you. Where can you learn more? Go to http://kenia-halima.info/. Enter P072 in the search box to learn more about \"Well Visit, Ages 25 to 48: Care Instructions. \" Current as of: July 19, 2016 Content Version: 11.3 © 7799-4832 [employer]. Care instructions adapted under license by [employer] (which disclaims liability or warranty for this information). If you have questions about a medical condition or this instruction, always ask your healthcare professional. [place] [address] any warranty or liability for your use of this information. Serogroup B Meningococcal Vaccine (MenB): What You Need to Know Why get vaccinated? Meningococcal disease is a serious illness caused by a type of bacteria called Neisseria meningitidis. It can lead to meningitis (infection of the lining of the brain and spinal cord) and infections of the blood. Meningococcal disease often occurs without warning  even among people who are otherwise healthy. Meningococcal disease can spread from person to person through close contact (coughing or kissing) or lengthy contact, especially among people living in the same household. There are at least 12 types of N. meningitidis, called \"serogroups. \" Serogroups A, B, C, W, and Y cause most meningococcal disease. Anyone can get meningococcal disease. But certain people are at increased risk, including: · Infants younger than one year old · Adolescents and young adults 12 through 21years old · People with certain medical conditions that affect the immune system · Microbiologists who routinely work with isolates of N. meningitidis · People at risk because of an outbreak in their community Even when it is treated, meningococcal disease kills 10 to 15 infected people out of 100. And of those who survive, about 10 to 20 out of every 100 will suffer disabilities such as hearing loss, brain damage, kidney damage, amputations, nervous system problems, or severe scars from skin grafts. Serogroup B meningococcal (MenB) vaccine can help prevent meningococcal disease caused by serogroup B. Other meningococcal vaccines are recommended to help protect against serogroups A, C, W, and Y. Serogroup B Meningococcal Vaccines Two serogroup B meningococcal vaccines  Bexsero® and Trumenba®  have been licensed by the Food and Drug Administration (FDA). These vaccines are recommended routinely for people 10 years or older who are at increased risk for serogroup B meningococcal infections, including: · People at risk because of a serogroup B meningococcal disease outbreak · Anyone whose spleen is damaged or has been removed · Anyone with a rare immune system condition called \"persistent complement component deficiency\" · Anyone taking a drug called eculizumab (also called Soliris®) · Microbiologists who routinely work with isolates of N. meningitidis These vaccines may also be given to anyone 12 through 21years old to provide short term protection against most strains of serogroup B meningococcal disease; 16 through 18 years are the preferred ages for vaccination. For best protection, more than 1 dose of a serogroup B meningococcal vaccine is needed. The same vaccine must be used for all doses. Ask your health care provider about the number and timing of doses. Some people should not get these vaccines Tell the person who is giving you the vaccine: · If you have any severe, life-threatening allergies. If you have ever had a life-threatening allergic reaction after a previous dose of serogroup B meningococcal vaccine, or if you have a severe allergy to any part of this vaccine, you should not get the vaccine.  Tell your health care provider if you have any severe allergies that you know of, including a severe allergy to latex. He or she can tell you about the vaccine's ingredients. · If you are pregnant or breastfeeding. There is not very much information about the potential risks of this vaccine for a pregnant woman or breastfeeding mother. It should be used during pregnancy only if clearly needed. If you have a mild illness, such as a cold, you can probably get the vaccine today. If you are moderately or severely ill, you should probably wait until you recover. Your doctor can advise you. Risks of a vaccine reaction With any medicine, including vaccines, there is a chance of reactions. These are usually mild and go away on their own within a few days, but serious reactions are also possible. More than half of the people who get serogroup B meningococcal vaccine have mild problems following vaccination. These reactions can last up to 3 to 7 days, and include: · Soreness, redness, or swelling where the shot was given · Tiredness or fatigue · Headache · Muscle or joint pain · Fever or chills · Nausea or diarrhea Other problems that could happen after these vaccines: 
· People sometimes faint after a medical procedure, including vaccination. Sitting or lying down for about 15 minutes can help prevent fainting and injuries caused by a fall. Tell your provider if you feel dizzy, or have vision changes or ringing in the ears. · Some people get shoulder pain that can be more severe and longer-lasting than the more routine soreness that can follow injections. This happens very rarely. · Any medication can cause a severe allergic reaction. Such reactions from a vaccine are very rare, estimated at about 1 in a million doses, and would happen within a few minutes to a few hours after the vaccination. As with any medicine, there is a very remote chance of a vaccine causing a serious injury or death. The safety of vaccines is always being monitored. For more information, visit the vaccine safety web site: www.cdc.gov/vaccinesafety. What if there is a serious reaction? What should I look for? · Look for anything that concerns you, such as signs of a severe allergic reaction, very high fever, or unusual behavior. Signs of a severe allergic reaction can include hives, swelling of the face and throat, difficulty breathing, a fast heartbeat, dizziness, and weakness. These would usually start a few minutes to a few hours after the vaccination. What should I do? · If you think it is a severe allergic reaction or other emergency that can't wait, call 911 and get to the nearest hospital. Otherwise, call your clinic. Afterward, the reaction should be reported to the Vaccine Adverse Event Reporting System (VAERS). Your doctor should file this report, or you can do it yourself through the VAERS website at www.vaers. hhs.gov, or by calling 2-799.257.6364. VAERS does not give medical advice. The National Vaccine Injury Compensation Program 
The National Vaccine Injury Compensation Program (VICP) is a federal program that was created to compensate people who may have been injured by certain vaccines. Persons who believe they may have been injured by a vaccine can learn about the program and about filing a claim by calling 6-448.870.1253 or visiting the [employer] website at www.[facility].gov/vaccinecompensation. There is a time limit to file a claim for compensation. How can I learn more? · Ask your health care provider. He or she can give you the vaccine package insert or suggest other sources of information. · Call your local or state health department. · Contact the Centers for Disease Control and Prevention (CDC): 
¨ Call 8-716.520.4999 (1-800-CDC-INFO) or ¨ Visit CDC's vaccines website at www.cdc.gov/vaccines Vaccine Information Statement Serogroup B Meningococcal Vaccine 8- 
42 GRISELDA Ortiz [id number] Department of Health and [employer] Centers for Disease Control and Prevention Many Vaccine Information Statements are available in Indian and other languages. See www.immunize.org/vis. Hojas de información sobre vacunas están disponibles en español y en muchos otros idiomas. Visite www.immunize.org/vis. Care instructions adapted under license by [employer] (which disclaims liability or warranty for this information). If you have questions about a medical condition or this instruction, always ask your healthcare professional. [address] any warranty or liability for your use of this information. 
  
 
 
Meningococcal ACWY Vaccines - MenACWY and MPSV4: What You Need to Know Why get vaccinated? Meningococcal disease is a serious illness caused by a type of bacteria called Neisseria meningitidis. It can lead to meningitis (infection of the lining of the brain and spinal cord) and infections of the blood. Meningococcal disease often occurs without warningeven among people who are otherwise healthy. Meningococcal disease can spread from person to person through close contact (coughing or kissing) or lengthy contact, especially among people living in the same household. There are at least 12 types of N. meningitidis, called \"serogroups. \" Serogroups A, B, C, W, and Y cause most meningococcal disease. Anyone can get meningococcal disease, but certain people are at increased risk, including: · Infants younger than 3year old. · Adolescents and young adults 12 through 21years old. · People with certain medical conditions that affect the immune system. · Microbiologists who routinely work with isolates of N. meningitidis. · People at risk because of an outbreak in their community. Even when it is treated, meningococcal disease kills 10 to 15 infected people out of 100.  And of those who survive, about 10 to 20 out of every 100 will suffer disabilities such as hearing loss, brain damage, kidney damage, amputations, nervous system problems, or severe scars from skin grafts. Meningococcal ACWY vaccines can help prevent meningococcal disease caused by serogroups A, C, W, and Y. A different meningococcal vaccine is available to help protect against serogroup B. Meningococcal ACWY vaccines There are two kinds of meningococcal vaccines licensed by the Food and Drug Administration (FDA) for protection against serogroups A, C, W, and Y: meningococcal conjugate vaccine (MenACWY) and meningococcal polysaccharide vaccine (MPSV4). Two doses of MenACWY are routinely recommended for adolescents 6 through 25years old: the first dose at 6or 15years old, with a booster dose at age 12. Some adolescents, including those with HIV, should get additional doses. Ask your health care provider for more information. In addition to routine vaccination for adolescents, MenACWY vaccine is also recommended for certain groups of people: · People at risk because of a serogroup A, C, W, or Y meningococcal disease outbreak · Anyone whose spleen is damaged or has been removed · Anyone with a rare immune system condition called \"persistent complement component deficiency\" · Anyone taking a drug called eculizumab (also called Soliris®) · Microbiologists who routinely work with isolates of N. meningitidis · Anyone traveling to, or living in, a part of the world where meningococcal disease is common, such as parts of [place] · College freshmen living in dormitories · [address] recruits Children between 2 and 22 months old and people with certain medical conditions need multiple doses for adequate protection. Ask your health care provider about the number and timing of doses and the need for booster doses.  
MenACWY is the preferred vaccine for people in these groups who are 2 months through 54years old, have received MenACWY previously, or anticipate requiring multiple doses. MPSV4 is recommended for adults older than 55 who anticipate requiring only a single dose (travelers, or during community outbreaks). Some people should not get this vaccine Tell the person who is giving you the vaccine: · If you have any severe, life-threatening allergies. If you have ever had a life-threatening allergic reaction after a previous dose of meningococcal ACWY vaccine, or if you have a severe allergy to any part of this vaccine, you should not get this vaccine. Your provider can tell you about the vaccine's ingredients. · If you are pregnant or breastfeeding. There is not very much information about the potential risks of this vaccine for a pregnant woman or breastfeeding mother. It should be used during pregnancy only if clearly needed. If you have a mild illness, such as a cold, you can probably get the vaccine today. If you are moderately or severely ill, you should probably wait until you recover. Your doctor can advise you. Risks of a vaccine reaction With any medicine, including vaccines, there is a chance of side effects. These are usually mild and go away on their own within a few days, but serious reactions are also possible. As many as half of the people who get meningococcal ACWY vaccine have mild problems following vaccination, such as redness or soreness where the shot was given. If these problems occur, they usually last for 1 or 2 days. They are more common after MenACWY than after MPSV4. A small percentage of people who receive the vaccine develop a mild fever. Problems that could happen after any injected vaccine: · People sometimes faint after a medical procedure, including vaccination. Sitting or lying down for about 15 minutes can help prevent fainting, and injuries caused by a fall. Tell your doctor if you feel dizzy or have vision changes or ringing in the ears. · Some people get severe pain in the shoulder and have difficulty moving the arm where a shot was given. This happens very rarely. · Any medication can cause a severe allergic reaction. Such reactions from a vaccine are very rare, estimated at about 1 in a million doses, and would happen within a few minutes to a few hours after the vaccination. As with any medicine, there is a very remote chance of a vaccine causing a serious injury or death. The safety of vaccines is always being monitored. For more information, visit: www.cdc.gov/vaccinesafety/. What if there is a serious reaction? What should I look for? · Look for anything that concerns you, such as signs of a severe allergic reaction, very high fever, or behavior changes. Signs of a severe allergic reaction can include hives, swelling of the face and throat, difficulty breathing, a fast heartbeat, dizziness, and weaknessusually within a few minutes to a few hours after the vaccination. What should I do? · If you think it is a severe allergic reaction or other emergency that can't wait, call 911 or get the person to the nearest hospital. Otherwise, call your doctor. · Afterward, the reaction should be reported to the Vaccine Adverse Event Reporting System (VAERS). Your doctor should file this report, or you can do it yourself through the VAERS website at www.vaers. hhs.gov, or by calling 3-514.594.2828. VAERS does not give medical advice. The National Vaccine Injury Compensation Program 
The National Vaccine Injury Compensation Program (VICP) is a federal program that was created to compensate people who may have been injured by certain vaccines. Persons who believe they may have been injured by a vaccine can learn about the program and about filing a claim by calling 1-936.499.4714 or visiting the [employer] website at www.[facility].gov/vaccinecompensation. There is a time limit to file a claim for compensation. How can I learn more? · Ask your health care provider. · Call your local or state health department. · Contact the Centers for Disease Control and Prevention (CDC): 
¨ Call 4-794.692.5420 (1-800-CDC-INFO) or ¨ Visit CDC's website at www.cdc.gov/vaccines Vaccine Information Statement Meningococcal ACWY Vaccines 03- 
42 GRISELDA Sanders [id number] [facility] and "[employer]" Centers for Disease Control and Prevention Many Vaccine Information Statements are available in Bengali and other languages. See www.immunize.org/vis. Hojas de Información Sobre Vacunas están disponibles en español y en muchos otros idiomas. Visite www.immunize.org/vis. Care instructions adapted under license by [employer] (which disclaims liability or warranty for this information). If you have questions about a medical condition or this instruction, always ask your healthcare professional. [address] any warranty or liability for your use of this information. Hepatitis A Vaccine: What You Need to Know Why get vaccinated? Hepatitis A is a serious liver disease. It is caused by the hepatitis A virus (HAV). HAV is spread from person to person through contact with the feces (stool) of people who are infected, which can easily happen if someone does not wash his or her hands properly. You can also get hepatitis A from food, water, or objects contaminated with HAV. Symptoms of hepatitis A can include: · Fever, fatigue, loss of appetite, nausea, vomiting, and/or joint pain. · Severe stomach pains and diarrhea (mainly in children). · Jaundice (yellow skin or eyes, dark urine, eddie-colored bowel movements). These symptoms usually appear 2 to 6 weeks after exposure and usually last less than 2 months, although some people can be ill for as long as 6 months. If you have hepatitis A, you may be too ill to work. Children often do not have symptoms, but most adults do. You can spread HAV without having symptoms. Hepatitis A can cause liver failure and death, although this is rare and occurs more commonly in persons 48years of age or older and persons with other liver diseases, such as hepatitis B or C. Hepatitis A vaccine can prevent hepatitis A. Hepatitis A vaccines were recommended in the [facility] beginning in 1996. Since then, the number of cases reported each year in the [place] has dropped from around 31,000 cases to fewer than 1,500 cases. Hepatitis A vaccine Hepatitis A vaccine is an inactivated (killed) vaccine. You will need 2 doses for long-lasting protection. These doses should be given at least 6 months apart. Children are routinely vaccinated between their first and second birthdays (15 through 22 months of age). Older children and adolescents can get the vaccine after 23 months. Adults who have not been vaccinated previously and want to be protected against hepatitis A can also get the vaccine. You should get hepatitis A vaccine if you: · Are traveling to countries where hepatitis A is common. · Are a man who has sex with other men. · Use illegal drugs. · Have a chronic liver disease such as hepatitis B or hepatitis C. 
· Are being treated with clotting-factor concentrates. · Work with hepatitis A-infected animals or in a hepatitis A research laboratory. · Expect to have close personal contact with an international adoptee from a country where hepatitis A is common. Ask your healthcare provider if you want more information about any of these groups. There are no known risks to getting hepatitis A vaccine at the same time as other vaccines. Some people should not get this vaccine Tell the person who is giving you the vaccine: · If you have any severe, life-threatening allergies.   
If you ever had a life-threatening allergic reaction after a dose of hepatitis A vaccine, or have a severe allergy to any part of this vaccine, you may be advised not to get vaccinated. Ask your health care provider if you want information about vaccine components. · If you are not feeling well. If you have a mild illness, such as a cold, you can probably get the vaccine today. If you are moderately or severely ill, you should probably wait until you recover. Your doctor can advise you. Risks of a vaccine reaction With any medicine, including vaccines, there is a chance of side effects. These are usually mild and go away on their own, but serious reactions are also possible. Most people who get hepatitis A vaccine do not have any problems with it. Minor problems following hepatitis A vaccine include: · Soreness or redness where the shot was given · Low-grade fever · Headache · Tiredness If these problems occur, they usually begin soon after the shot and last 1 or 2 days. Your doctor can tell you more about these reactions. Other problems that could happen after this vaccine: · People sometimes faint after a medical procedure, including vaccination. Sitting or lying down for about 15 minutes can help prevent fainting, and injuries caused by a fall. Tell your provider if you feel dizzy, or have vision changes or ringing in the ears. · Some people get shoulder pain that can be more severe and longer lasting than the more routine soreness that can follow injections. This happens very rarely. · Any medication can cause a severe allergic reaction. Such reactions from a vaccine are very rare, estimated at about 1 in a million doses, and would happen within a few minutes to a few hours after the vaccination. As with any medicine, there is a very remote chance of a vaccine causing a serious injury or death. The safety of vaccines is always being monitored. For more information, visit: www.cdc.gov/vaccinesafety. What if there is a serious problem? What should I look for?  
· Look for anything that concerns you, such as signs of a severe allergic reaction, very high fever, or unusual behavior. Signs of a severe allergic reaction can include hives, swelling of the face and throat, difficulty breathing, a fast heartbeat, dizziness, and weakness. These would usually start a few minutes to a few hours after the vaccination. What should I do? · If you think it is a severe allergic reaction or other emergency that can't wait, call call 911and get to the nearest hospital. Otherwise, call your clinic. · Afterward, the reaction should be reported to the Vaccine Adverse Event Reporting System (VAERS). Your doctor should file this report, or you can do it yourself through the VAERS web site at www.vaers. [facility].gov, or by calling 0-935.264.8301. VAERS does not give medical advice. The National Vaccine Injury Compensation Program 
The National Vaccine Injury Compensation Program (VICP) is a federal program that was created to compensate people who may have been injured by certain vaccines. Persons who believe they may have been injured by a vaccine can learn about the program and about filing a claim by calling 8-963.793.5722 or visiting the [employer] website at www.[facility].gov/vaccinecompensation. There is a time limit to file a claim for compensation. How can I learn more? · Ask your healthcare provider. He or she can give you the vaccine package insert or suggest other sources of information. · Call your local or state health department. · Contact the Centers for Disease Control and Prevention (CDC): 
¨ Call 8-224.875.2880 (1-800-CDC-INFO). ¨ Visit CDC's website at www.cdc.gov/vaccines. Vaccine Information Statement Hepatitis A Vaccine 7/20/2016 
42 GRISELDA Qureshi [id number] [place] Department of Health and 800APPE Signdat Centers for Disease Control and Prevention Many Vaccine Information Statements are available in [place]iny[place] and other languages. See www.immunize.org/vis.  
Hojas de información sobre vacunas están disponibles en español y en otros db. Visite www.immunize.org/vis. Care instructions adapted under license by [employer] (which disclaims liability or warranty for this information). If you have questions about a medical condition or this instruction, always ask your healthcare professional. [address] any warranty or liability for your use of this information. 
  
 
Chickenpox Vaccine: What You Need to Know Why get vaccinated? Chickenpox (also called varicella) is a common childhood disease. It is usually mild, but it can be serious, especially in young infants and adults. · It causes a rash, itching, fever, and tiredness. · It can lead to severe skin infection, scars, pneumonia, brain damage, or death. · The chickenpox virus can be spread from person to person through the air, or by contact with fluid from chickenpox blisters. · A person who has had chickenpox can get a painful rash called shingles years later. · Before the vaccine, about 11,000 people were hospitalized for chickenpox each year in the [place]. · Before the vaccine, about 100 people  each year as a result of chickenpox in the [place]. Chickenpox vaccine can prevent chickenpox. Most people who get chickenpox vaccine will not get chickenpox. But if someone who has been vaccinated does get chickenpox, it is usually very mild. They will have fewer blisters, are less likely to have a fever, and will recover faster. Who should get chickenpox vaccine and when? Routine Children who have never had chickenpox should get 2 doses of chickenpox vaccine at these ages: · 1st Dose: 1515 months of age · 2nd Dose: 36 years of age (may be given earlier, if at least 3 months after the 1st dose) People 15years of age and older (who have never had chickenpox or received chickenpox vaccine) should get two doses at least 28 days apart. Catch-up Anyone who is not fully vaccinated, and never had chickenpox, should receive one or two doses of chickenpox vaccine. The timing of these doses depends on the person's age. Ask your doctor. Chickenpox vaccine may be given at the same time as other vaccines. Note: A \"combination\" vaccine called MMRV, which contains both chickenpox and MMR and vaccines, may be given instead of the two individual vaccines to people 15years of age and younger. Some people should not get chickenpox vaccine or should wait · People should not get chickenpox vaccine if they have ever had a life-threatening allergic reaction to a previous dose of chickenpox vaccine or to gelatin or the antibiotic neomycin. · People who are moderately or severely ill at the time the shot is scheduled should usually wait until they recover before getting chickenpox vaccine. · Pregnant women should wait to get chickenpox vaccine until after they have given birth. Women should not get pregnant for 1 month after getting chickenpox vaccine. · Some people should check with their doctor about whether they should get chickenpox vaccine, including anyone who: 
¨ Has HIV/AIDS or another disease that affects the immune system. ¨ Is being treated with drugs that affect the immune system, such as steroids, for 2 weeks or longer. ¨ Has any kind of cancer. ¨ Is getting cancer treatment with radiation or drugs. · People who recently had a transfusion or were given other blood products should ask their doctor when they may get chickenpox vaccine. Ask your doctor for more information. What are the risks from chickenpox vaccine? A vaccine, like any medicine, is capable of causing serious problems, such as severe allergic reactions. The risk of chickenpox vaccine causing serious harm, or death, is extremely small. Getting chickenpox vaccine is much safer than getting chickenpox disease. Most people who get chickenpox vaccine do not have any problems with it.  Reactions are usually more likely after the first dose than after the second. Mild problems · Soreness or swelling where the shot was given (about 1 out of 5 children and up to 1 out of 3 adolescents and adults) · Fever (1 person out of 10, or less) · Mild rash, up to a month after vaccination (1 person out of 25). It is possible for these people to infect other members of their household, but this is extremely rare. Moderate problems · Seizure (jerking or staring) caused by fever (very rare) Severe problems · Pneumonia (very rare) Other serious problems, including severe brain reactions and low blood count, have been reported after chickenpox vaccination. These happen so rarely experts cannot tell whether they are caused by the vaccine or not. If they are, it is extremely rare. Note: The first dose of MMRV vaccine has been associated with rash and higher rates of fever than MMR and varicella vaccines given separately. Rash has been reported in about 1 person in 20 and fever in about 1 person in 5. Seizures caused by a fever are also reported more often after MMRV. These usually occur 5-12 days after the first dose. What if there is a serious reaction? What should I look for? · Look for anything that concerns you, such as signs of a severe allergic reaction, very high fever, or behavior changes. Signs of a severe allergic reaction can include hives, swelling of the face and throat, difficulty breathing, a fast heartbeat, dizziness, and weakness. These would start a few minutes to a few hours after the vaccination. What should I do? · If you think it is a severe allergic reaction or other emergency that can't wait, call 9-1-1 or get the person to the nearest hospital. Otherwise, call your doctor. · Afterward, the reaction should be reported to the Vaccine Adverse Event Reporting System (VAERS). Your doctor might file this report, or you can do it yourself through the VAERS web site at www.vaers. hhs.gov, or by calling 7-948.220.6510. VAERS is only for reporting reactions. They do not give medical advice. The National Vaccine Injury Compensation Program 
The National Vaccine Injury Compensation Program (VICP) is a federal program that was created to compensate people who may have been injured by certain vaccines. Persons who believe they may have been injured by a vaccine can learn about the program and about filing a claim by calling 6-626.865.7034 or visiting the [employer] website at www.[facility].gov/vaccinecompensation. How can I learn more? · Ask your doctor. · Call your local or state health department. · Contact the Centers for Disease Control and Prevention (CDC): 
¨ Call 6-798.129.4441 (1-800-CDC-INFO) or ¨ Visit CDC's website at www.cdc.gov/vaccines Vaccine Information Statement (Interim) Varicella Vaccine 
(3/13/2008) 42 GRISELDA Phelps [id number]-78 Department of Health and [employer] Centers for Disease Control and Prevention Many Vaccine Information Statements are available in [place]ish and other languages. See www.immunize.org/vis. Muchas hojas de información sobre vacunas están disponibles en español y en otros idiomas. Visite www.immunize.org/vis. Care instructions adapted under license by [employer] (which disclaims liability or warranty for this information). If you have questions about a medical condition or this instruction, always ask your healthcare professional. [address] any warranty or liability for your use of this information. 
  
 
 
 
 
  
Introducing [facility]! Lulu Rivas introduces [employer] patient portal. Now you can access parts of your medical record, email your doctor's office, and request medication refills online. 1. In your internet browser, go to [email or web address] 2. Click on the First Time User? Click Here link in the Sign In box. You will see the New Member Sign Up page. 3. Enter your [employer] Access Code exactly as it appears below.  You will not need to use this code after youve completed the sign-up process. If you do not sign up before the expiration date, you must request a new code. · [email or web address] Access Code: [email or web address] Expires: 1/4/2018 11:16 AM 
 
4. Enter the last four digits of your Social Security Number (xxxx) and Date of Birth (mm/dd/yyyy) as indicated and click Submit. You will be taken to the next sign-up page. 5. Create a [email or web address] ID. This will be your [email or web address] login ID and cannot be changed, so think of one that is secure and easy to remember. 6. Create a [employer] password. You can change your password at any time. 7. Enter your Password Reset Question and Answer. This can be used at a later time if you forget your password. 8. Enter your e-mail address. You will receive e-mail notification when new information is available in [address]. 9. Click Sign Up. You can now view and download portions of your medical record. 10. Click the Download Summary menu link to download a portable copy of your medical information. If you have questions, please visit the Frequently Asked Questions section of the [email or web address] website. Remember, [email or web address] is NOT to be used for urgent needs. For medical emergencies, dial 911. Now available from your iPhone and Android! Please provide this summary of care documentation to your next provider. Your primary care clinician is listed as Sumeet Rush. If you have any questions after today's visit, please call [phone].

## 2017-10-06 NOTE — PROGRESS NOTES
Subjective:   Julita Orozco is a 23 y.o. male presenting for his annual checkup. 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 He is receiving home-schooling currently (homebound), 2 hrs per week. His legal-guardian, his sister, has been looking into both [employer] for the near future. She denies he has had any new health issues recently, she is interested in updating his immunizations  She would like to have him screened for H. Pylori, as she herself has been diagnosed with the condition. Meds: none currently  Allergies: milk, pork         Objective:     Visit Vitals    Temp 96.4 °F (35.8 °C) (Axillary)    Ht 5' (1.524 m)    Wt 103 lb 6.4 oz (46.9 kg)    BMI 20.19 kg/m2       ENT- TMs partially visualized; nose- clear; thr - unable to visualize   Neck supple. No adenopathy or thyromegaly. SHAWN. Lungs are clear, good air entry, no wheezes, rhonchi or rales. S1 and S2 normal, no murmurs, regular rate and rhythm. Abdomen is soft without tenderness, guarding, mass or organomegaly.  exam: no penile lesions or discharge, no testicular masses or tenderness, no hernias. Extremities show no edema, normal peripheral pulses. Neurological is normal without focal findings. Assessment/Plan:   23 yr old, MR, Cri-du-chat, no new acute health issues  .   Varivax, Menveo, Bexsero today (Td not available in this office)    He is to return for lab work (H.pylori, CBC, CMP, cholesterol)

## 2017-10-06 NOTE — PROGRESS NOTES
1. Have you been to the ER, urgent care clinic since your last visit? Hospitalized since your last visit? No    2. Have you seen or consulted any other health care providers outside of the [address] [place] [address] since your last visit? Include any pap smears or colon screening.  No    Chief Complaint   Patient presents with    Well Child     Visit Vitals    Temp 96.4 °F (35.8 °C) (Axillary)    Ht 5' (1.524 m)    Wt 103 lb 6.4 oz (46.9 kg)    BMI 20.19 kg/m2     BP unobtainable due to movement

## 2017-10-06 NOTE — PATIENT INSTRUCTIONS
Well Visit, Ages 25 to 48: Care Instructions  Your Care Instructions  Physical exams can help you stay healthy. Your doctor has checked your overall health and may have suggested ways to take good care of yourself. He or she also may have recommended tests. At home, you can help prevent illness with healthy eating, regular exercise, and other steps. Follow-up care is a key part of your treatment and safety. Be sure to make and go to all appointments, and call your doctor if you are having problems. It's also a good idea to know your test results and keep a list of the medicines you take. How can you care for yourself at home? · Reach and stay at a healthy weight. This will lower your risk for many problems, such as obesity, diabetes, heart disease, and high blood pressure. · Get at least 30 minutes of physical activity on most days of the week. Walking is a good choice. You also may want to do other activities, such as running, swimming, cycling, or playing tennis or team sports. Discuss any changes in your exercise program with your doctor. · Do not smoke or allow others to smoke around you. If you need help quitting, talk to your doctor about stop-smoking programs and medicines. These can increase your chances of quitting for good. · Talk to your doctor about whether you have any risk factors for sexually transmitted infections (STIs). Having one sex partner (who does not have STIs and does not have sex with anyone else) is a good way to avoid these infections. · Use birth control if you do not want to have children at this time. Talk with your doctor about the choices available and what might be best for you. · Protect your skin from too much sun. When you're outdoors from 10 a.m. to 4 p.m., stay in the shade or cover up with clothing and a hat with a wide brim. Wear sunglasses that block UV rays. Even when it's cloudy, put broad-spectrum sunscreen (SPF 30 or higher) on any exposed skin.   · See a dentist one or two times a year for checkups and to have your teeth cleaned. · Wear a seat belt in the car. · Drink alcohol in moderation, if at all. That means no more than 2 drinks a day for men and 1 drink a day for women. Follow your doctor's advice about when to have certain tests. These tests can spot problems early. For everyone  · Cholesterol. Have the fat (cholesterol) in your blood tested after age 21. Your doctor will tell you how often to have this done based on your age, family history, or other things that can increase your risk for heart disease. · Blood pressure. Have your blood pressure checked during a routine doctor visit. Your doctor will tell you how often to check your blood pressure based on your age, your blood pressure results, and other factors. · Vision. Talk with your doctor about how often to have a glaucoma test.  · Diabetes. Ask your doctor whether you should have tests for diabetes. · Colon cancer. Have a test for colon cancer at age 48. You may have one of several tests. If you are younger than 48, you may need a test earlier if you have any risk factors. Risk factors include whether you already had a precancerous polyp removed from your colon or whether your parent, brother, sister, or child has had colon cancer. For women  · Breast exam and mammogram. Talk to your doctor about when you should have a clinical breast exam and a mammogram. Medical experts differ on whether and how often women under 50 should have these tests. Your doctor can help you decide what is right for you. · Pap test and pelvic exam. Begin Pap tests at age 24. A Pap test is the best way to find cervical cancer. The test often is part of a pelvic exam. Ask how often to have this test.  · Tests for sexually transmitted infections (STIs). Ask whether you should have tests for STIs. You may be at risk if you have sex with more than one person, especially if your partners do not wear condoms.   For men  · Tests for sexually transmitted infections (STIs). Ask whether you should have tests for STIs. You may be at risk if you have sex with more than one person, especially if you do not wear a condom. · Testicular cancer exam. Ask your doctor whether you should check your testicles regularly. · Prostate exam. Talk to your doctor about whether you should have a blood test (called a PSA test) for prostate cancer. Experts differ on whether and when men should have this test. Some experts suggest it if you are older than 39 and are -American or have a father or brother who got prostate cancer when he was younger than 72. When should you call for help? Watch closely for changes in your health, and be sure to contact your doctor if you have any problems or symptoms that concern you. Where can you learn more? Go to http://kenia-halima.info/. Enter P072 in the search box to learn more about \"Well Visit, Ages 25 to 48: Care Instructions. \"  Current as of: July 19, 2016  Content Version: 11.3  © 3201-8178 [employer]. Care instructions adapted under license by [employer] (which disclaims liability or warranty for this information). If you have questions about a medical condition or this instruction, always ask your healthcare professional. [place] [address] any warranty or liability for your use of this information. Serogroup B Meningococcal Vaccine (MenB): What You Need to Know  Why get vaccinated? Meningococcal disease is a serious illness caused by a type of bacteria called Neisseria meningitidis. It can lead to meningitis (infection of the lining of the brain and spinal cord) and infections of the blood. Meningococcal disease often occurs without warning - even among people who are otherwise healthy.   Meningococcal disease can spread from person to person through close contact (coughing or kissing) or lengthy contact, especially among people living in the same household. There are at least 12 types of N. meningitidis, called \"serogroups. \" Serogroups A, B, C, W, and Y cause most meningococcal disease. Anyone can get meningococcal disease. But certain people are at increased risk, including:  · Infants younger than one year old  · Adolescents and young adults 12 through 21years old  · People with certain medical conditions that affect the immune system  · Microbiologists who routinely work with isolates of N. meningitidis  · People at risk because of an outbreak in their community  Even when it is treated, meningococcal disease kills 10 to 15 infected people out of 100. And of those who survive, about 10 to 20 out of every 100 will suffer disabilities such as hearing loss, brain damage, kidney damage, amputations, nervous system problems, or severe scars from skin grafts. Serogroup B meningococcal (MenB) vaccine can help prevent meningococcal disease caused by serogroup B. Other meningococcal vaccines are recommended to help protect against serogroups A, C, W, and Y. Serogroup B Meningococcal Vaccines  Two serogroup B meningococcal vaccines - Bexsero® and Trumenba® - have been licensed by the [employer] and Drug Administration (FDA). These vaccines are recommended routinely for people 10 years or older who are at increased risk for serogroup B meningococcal infections, including:  · People at risk because of a serogroup B meningococcal disease outbreak  · Anyone whose spleen is damaged or has been removed  · Anyone with a rare immune system condition called \"persistent complement component deficiency\"  · Anyone taking a drug called eculizumab (also called Soliris®)  · Microbiologists who routinely work with isolates of N. meningitidis  These vaccines may also be given to anyone 12 through 21years old to provide short term protection against most strains of serogroup B meningococcal disease; 16 through 18 years are the preferred ages for vaccination.   For best protection, more than 1 dose of a serogroup B meningococcal vaccine is needed. The same vaccine must be used for all doses. Ask your health care provider about the number and timing of doses. Some people should not get these vaccines  Tell the person who is giving you the vaccine:  · If you have any severe, life-threatening allergies. If you have ever had a life-threatening allergic reaction after a previous dose of serogroup B meningococcal vaccine, or if you have a severe allergy to any part of this vaccine, you should not get the vaccine. Tell your health care provider if you have any severe allergies that you know of, including a severe allergy to latex. He or she can tell you about the vaccine's ingredients. · If you are pregnant or breastfeeding. There is not very much information about the potential risks of this vaccine for a pregnant woman or breastfeeding mother. It should be used during pregnancy only if clearly needed. If you have a mild illness, such as a cold, you can probably get the vaccine today. If you are moderately or severely ill, you should probably wait until you recover. Your doctor can advise you. Risks of a vaccine reaction  With any medicine, including vaccines, there is a chance of reactions. These are usually mild and go away on their own within a few days, but serious reactions are also possible. More than half of the people who get serogroup B meningococcal vaccine have mild problems following vaccination. These reactions can last up to 3 to 7 days, and include:  · Soreness, redness, or swelling where the shot was given  · Tiredness or fatigue  · Headache  · Muscle or joint pain  · Fever or chills  · Nausea or diarrhea  Other problems that could happen after these vaccines:  · People sometimes faint after a medical procedure, including vaccination. Sitting or lying down for about 15 minutes can help prevent fainting and injuries caused by a fall.  Tell your provider if you feel dizzy, or have vision changes or ringing in the ears. · Some people get shoulder pain that can be more severe and longer-lasting than the more routine soreness that can follow injections. This happens very rarely. · Any medication can cause a severe allergic reaction. Such reactions from a vaccine are very rare, estimated at about 1 in a million doses, and would happen within a few minutes to a few hours after the vaccination. As with any medicine, there is a very remote chance of a vaccine causing a serious injury or death. The safety of vaccines is always being monitored. For more information, visit the vaccine safety web site: www.cdc.gov/vaccinesafety. What if there is a serious reaction? What should I look for? · Look for anything that concerns you, such as signs of a severe allergic reaction, very high fever, or unusual behavior. Signs of a severe allergic reaction can include hives, swelling of the face and throat, difficulty breathing, a fast heartbeat, dizziness, and weakness. These would usually start a few minutes to a few hours after the vaccination. What should I do? · If you think it is a severe allergic reaction or other emergency that can't wait, call 911 and get to the nearest hospital. Otherwise, call your clinic. Afterward, the reaction should be reported to the Vaccine Adverse Event Reporting System (VAERS). Your doctor should file this report, or you can do it yourself through the VAERS website at www.vaers. hhs.gov, or by calling 4-657.108.2469. VAERS does not give medical advice. The National Vaccine Injury Compensation Program  The National Vaccine Injury Compensation Program (VICP) is a federal program that was created to compensate people who may have been injured by certain vaccines.   Persons who believe they may have been injured by a vaccine can learn about the program and about filing a claim by calling 0-295.340.6318 or visiting the [employer] website at www.hrsa.gov/vaccinecompensation. There is a time limit to file a claim for compensation. How can I learn more? · Ask your health care provider. He or she can give you the vaccine package insert or suggest other sources of information. · Call your local or state health department. · Contact the Centers for Disease Control and Prevention (CDC):  ¨ Call 0-902.902.9926 (1-800-CDC-INFO) or  ¨ Visit CDC's vaccines website at www.cdc.gov/vaccines  Vaccine Information Statement  Serogroup B Meningococcal Vaccine  8-  42 U. Thelda Cushing [id number]  Department of Health and Human Services  Centers for Disease Control and Prevention  Many Vaccine Information Statements are available in [place] and other languages. See www.immunize.org/vis. Hojas de información sobre vacunas están disponibles en español y en muchos otros idiomas. Visite www.immunize.org/vis. Care instructions adapted under license by [employer] (which disclaims liability or warranty for this information). If you have questions about a medical condition or this instruction, always ask your healthcare professional. [place] [address] any warranty or liability for your use of this information.         Meningococcal ACWY Vaccines - MenACWY and MPSV4: What You Need to Know  Why get vaccinated? Meningococcal disease is a serious illness caused by a type of bacteria called Neisseria meningitidis. It can lead to meningitis (infection of the lining of the brain and spinal cord) and infections of the blood. Meningococcal disease often occurs without warning--even among people who are otherwise healthy. Meningococcal disease can spread from person to person through close contact (coughing or kissing) or lengthy contact, especially among people living in the same household. There are at least 12 types of N. meningitidis, called \"serogroups. \" Serogroups A, B, C, W, and Y cause most meningococcal disease.   Anyone can get meningococcal disease, but certain people are at increased risk, including:  · Infants younger than 3year old. · Adolescents and young adults 12 through 21years old. · People with certain medical conditions that affect the immune system. · Microbiologists who routinely work with isolates of N. meningitidis. · People at risk because of an outbreak in their community. Even when it is treated, meningococcal disease kills 10 to 15 infected people out of 100. And of those who survive, about 10 to 20 out of every 100 will suffer disabilities such as hearing loss, brain damage, kidney damage, amputations, nervous system problems, or severe scars from skin grafts. Meningococcal ACWY vaccines can help prevent meningococcal disease caused by serogroups A, C, W, and Y. A different meningococcal vaccine is available to help protect against serogroup B. Meningococcal ACWY vaccines  There are two kinds of meningococcal vaccines licensed by the Food and Drug Administration (FDA) for protection against serogroups A, C, W, and Y: meningococcal conjugate vaccine (MenACWY) and meningococcal polysaccharide vaccine (MPSV4). Two doses of MenACWY are routinely recommended for adolescents 6 through 25years old: the first dose at 6or 15years old, with a booster dose at age 12. Some adolescents, including those with HIV, should get additional doses. Ask your health care provider for more information.   In addition to routine vaccination for adolescents, MenACWY vaccine is also recommended for certain groups of people:  · People at risk because of a serogroup A, C, W, or Y meningococcal disease outbreak  · Anyone whose spleen is damaged or has been removed  · Anyone with a rare immune system condition called \"persistent complement component deficiency\"  · Anyone taking a drug called eculizumab (also called Soliris®)  · Microbiologists who routinely work with isolates of N. meningitidis  · Anyone traveling to, or living in, a part of the world where meningococcal disease is common, such as parts of [place]  · [employer] freshmen living in dormitories  · [address] recruits  Children between 2 and 22 months old and people with certain medical conditions need multiple doses for adequate protection. Ask your health care provider about the number and timing of doses and the need for booster doses. MenACWY is the preferred vaccine for people in these groups who are 2 months through 54years old, have received MenACWY previously, or anticipate requiring multiple doses. MPSV4 is recommended for adults older than 55 who anticipate requiring only a single dose (travelers, or during community outbreaks). Some people should not get this vaccine  Tell the person who is giving you the vaccine:  · If you have any severe, life-threatening allergies. If you have ever had a life-threatening allergic reaction after a previous dose of meningococcal ACWY vaccine, or if you have a severe allergy to any part of this vaccine, you should not get this vaccine. Your provider can tell you about the vaccine's ingredients. · If you are pregnant or breastfeeding. There is not very much information about the potential risks of this vaccine for a pregnant woman or breastfeeding mother. It should be used during pregnancy only if clearly needed. If you have a mild illness, such as a cold, you can probably get the vaccine today. If you are moderately or severely ill, you should probably wait until you recover. Your doctor can advise you. Risks of a vaccine reaction  With any medicine, including vaccines, there is a chance of side effects. These are usually mild and go away on their own within a few days, but serious reactions are also possible. As many as half of the people who get meningococcal ACWY vaccine have mild problems following vaccination, such as redness or soreness where the shot was given. If these problems occur, they usually last for 1 or 2 days.  They are more common after MenACWY than after MPSV4. A small percentage of people who receive the vaccine develop a mild fever. Problems that could happen after any injected vaccine:  · People sometimes faint after a medical procedure, including vaccination. Sitting or lying down for about 15 minutes can help prevent fainting, and injuries caused by a fall. Tell your doctor if you feel dizzy or have vision changes or ringing in the ears. · Some people get severe pain in the shoulder and have difficulty moving the arm where a shot was given. This happens very rarely. · Any medication can cause a severe allergic reaction. Such reactions from a vaccine are very rare, estimated at about 1 in a million doses, and would happen within a few minutes to a few hours after the vaccination. As with any medicine, there is a very remote chance of a vaccine causing a serious injury or death. The safety of vaccines is always being monitored. For more information, visit: www.cdc.gov/vaccinesafety/. What if there is a serious reaction? What should I look for? · Look for anything that concerns you, such as signs of a severe allergic reaction, very high fever, or behavior changes. Signs of a severe allergic reaction can include hives, swelling of the face and throat, difficulty breathing, a fast heartbeat, dizziness, and weakness--usually within a few minutes to a few hours after the vaccination. What should I do? · If you think it is a severe allergic reaction or other emergency that can't wait, call 911 or get the person to the nearest hospital. Otherwise, call your doctor. · Afterward, the reaction should be reported to the Vaccine Adverse Event Reporting System (VAERS). Your doctor should file this report, or you can do it yourself through the VAERS website at www.vaers. hhs.gov, or by calling 8-185.209.7544. VAERS does not give medical advice.   The [place] Vaccine Injury Compensation Program  The [place] Vaccine Injury Compensation Program (VICP) is a federal program that was created to compensate people who may have been injured by certain vaccines. Persons who believe they may have been injured by a vaccine can learn about the program and about filing a claim by calling 9-578.862.7363 or visiting the [facility] website at www.[facility].gov/vaccinecompensation. There is a time limit to file a claim for compensation. How can I learn more? · Ask your health care provider. · Call your local or state health department. · Contact the Centers for Disease Control and Prevention (CDC):  ¨ Call 0-456.482.5350 (1-800-CDC-INFO) or  ¨ Visit CDC's website at www.cdc.gov/vaccines  Vaccine Information Statement  Meningococcal ACWY Vaccines  03-  42 U. Jose Wagner [id number]  Department of Health and Human Services  Centers for Disease Control and Prevention  Many Vaccine Information Statements are available in Beninese and other languages. See www.immunize.org/vis. Hojas de Información Sobre Vacunas están disponibles en español y en muchos otros idiomas. Visite www.immunize.org/vis. Care instructions adapted under license by [employer] (which disclaims liability or warranty for this information). If you have questions about a medical condition or this instruction, always ask your healthcare professional. [address] any warranty or liability for your use of this information. Hepatitis A Vaccine: What You Need to Know  Why get vaccinated? Hepatitis A is a serious liver disease. It is caused by the hepatitis A virus (HAV). HAV is spread from person to person through contact with the feces (stool) of people who are infected, which can easily happen if someone does not wash his or her hands properly. You can also get hepatitis A from food, water, or objects contaminated with HAV. Symptoms of hepatitis A can include:  · Fever, fatigue, loss of appetite, nausea, vomiting, and/or joint pain.   · Severe stomach pains and diarrhea (mainly in children). · Jaundice (yellow skin or eyes, dark urine, eddie-colored bowel movements). These symptoms usually appear 2 to 6 weeks after exposure and usually last less than 2 months, although some people can be ill for as long as 6 months. If you have hepatitis A, you may be too ill to work. Children often do not have symptoms, but most adults do. You can spread HAV without having symptoms. Hepatitis A can cause liver failure and death, although this is rare and occurs more commonly in persons 48years of age or older and persons with other liver diseases, such as hepatitis B or C. Hepatitis A vaccine can prevent hepatitis A. Hepatitis A vaccines were recommended in the [facility] beginning in 1996. Since then, the number of cases reported each year in the [place] has dropped from around 31,000 cases to fewer than 1,500 cases. Hepatitis A vaccine  Hepatitis A vaccine is an inactivated (killed) vaccine. You will need 2 doses for long-lasting protection. These doses should be given at least 6 months apart. Children are routinely vaccinated between their first and second birthdays (15 through 22 months of age). Older children and adolescents can get the vaccine after 23 months. Adults who have not been vaccinated previously and want to be protected against hepatitis A can also get the vaccine. You should get hepatitis A vaccine if you:  · Are traveling to countries where hepatitis A is common. · Are a man who has sex with other men. · Use illegal drugs. · Have a chronic liver disease such as hepatitis B or hepatitis C.  · Are being treated with clotting-factor concentrates. · Work with hepatitis A-infected animals or in a hepatitis A research laboratory. · Expect to have close personal contact with an international adoptee from a country where hepatitis A is common. Ask your healthcare provider if you want more information about any of these groups.   There are no known risks to getting hepatitis A vaccine at the same time as other vaccines. Some people should not get this vaccine  Tell the person who is giving you the vaccine:  · If you have any severe, life-threatening allergies. If you ever had a life-threatening allergic reaction after a dose of hepatitis A vaccine, or have a severe allergy to any part of this vaccine, you may be advised not to get vaccinated. Ask your health care provider if you want information about vaccine components. · If you are not feeling well. If you have a mild illness, such as a cold, you can probably get the vaccine today. If you are moderately or severely ill, you should probably wait until you recover. Your doctor can advise you. Risks of a vaccine reaction  With any medicine, including vaccines, there is a chance of side effects. These are usually mild and go away on their own, but serious reactions are also possible. Most people who get hepatitis A vaccine do not have any problems with it. Minor problems following hepatitis A vaccine include:  · Soreness or redness where the shot was given  · Low-grade fever  · Headache  · Tiredness  If these problems occur, they usually begin soon after the shot and last 1 or 2 days. Your doctor can tell you more about these reactions. Other problems that could happen after this vaccine:  · People sometimes faint after a medical procedure, including vaccination. Sitting or lying down for about 15 minutes can help prevent fainting, and injuries caused by a fall. Tell your provider if you feel dizzy, or have vision changes or ringing in the ears. · Some people get shoulder pain that can be more severe and longer lasting than the more routine soreness that can follow injections. This happens very rarely. · Any medication can cause a severe allergic reaction. Such reactions from a vaccine are very rare, estimated at about 1 in a million doses, and would happen within a few minutes to a few hours after the vaccination.   As with any medicine, there is a very remote chance of a vaccine causing a serious injury or death. The safety of vaccines is always being monitored. For more information, visit: www.cdc.gov/vaccinesafety. What if there is a serious problem? What should I look for? · Look for anything that concerns you, such as signs of a severe allergic reaction, very high fever, or unusual behavior. Signs of a severe allergic reaction can include hives, swelling of the face and throat, difficulty breathing, a fast heartbeat, dizziness, and weakness. These would usually start a few minutes to a few hours after the vaccination. What should I do? · If you think it is a severe allergic reaction or other emergency that can't wait, call call 911and get to the nearest hospital. Otherwise, call your clinic. · Afterward, the reaction should be reported to the Vaccine Adverse Event Reporting System (VAERS). Your doctor should file this report, or you can do it yourself through the VAERS web site at www.vaers. hhs.gov, or by calling 4-815.195.6088. VAERS does not give medical advice. The National Vaccine Injury Compensation Program  The National Vaccine Injury Compensation Program (VICP) is a federal program that was created to compensate people who may have been injured by certain vaccines. Persons who believe they may have been injured by a vaccine can learn about the program and about filing a claim by calling 5-960.680.9333 or visiting the [employer] website at www.[facility].gov/vaccinecompensation. There is a time limit to file a claim for compensation. How can I learn more? · Ask your healthcare provider. He or she can give you the vaccine package insert or suggest other sources of information. · Call your local or state health department. · Contact the Centers for Disease Control and Prevention (CDC):  ¨ Call 4-480.217.1836 (1-800-CDC-INFO). ¨ Visit CDC's website at www.cdc.gov/vaccines.   Vaccine Information Statement  Hepatitis A Vaccine  2016  42 U. S.C. § 300aa-26  [place]. S. Department of Health and Human Services  Centers for Disease Control and Prevention  Many Vaccine Information Statements are available in Italian and other languages. See www.immunize.org/vis. Isaacjas de información sobre vacunas están disponibles en español y en otros idiomas. Visite www.immunize.org/vis. Care instructions adapted under license by [employer] (which disclaims liability or warranty for this information). If you have questions about a medical condition or this instruction, always ask your healthcare professional. [place] [address] any warranty or liability for your use of this information.       Chickenpox Vaccine: What You Need to Know  Why get vaccinated? Chickenpox (also called varicella) is a common childhood disease. It is usually mild, but it can be serious, especially in young infants and adults. · It causes a rash, itching, fever, and tiredness. · It can lead to severe skin infection, scars, pneumonia, brain damage, or death. · The chickenpox virus can be spread from person to person through the air, or by contact with fluid from chickenpox blisters. · A person who has had chickenpox can get a painful rash called shingles years later. · Before the vaccine, about 11,000 people were hospitalized for chickenpox each year in the [place]. · Before the vaccine, about 100 people  each year as a result of chickenpox in the [place]. Chickenpox vaccine can prevent chickenpox. Most people who get chickenpox vaccine will not get chickenpox. But if someone who has been vaccinated does get chickenpox, it is usually very mild. They will have fewer blisters, are less likely to have a fever, and will recover faster. Who should get chickenpox vaccine and when?   Routine  Children who have never had chickenpox should get 2 doses of chickenpox vaccine at these ages:  · 1st Dose: 1515 months of age  · 2nd Dose: 4-6 years of age (may be given earlier, if at least 3 months after the 1st dose)  People 15years of age and older (who have never had chickenpox or received chickenpox vaccine) should get two doses at least 28 days apart. Catch-up  Anyone who is not fully vaccinated, and never had chickenpox, should receive one or two doses of chickenpox vaccine. The timing of these doses depends on the person's age. Ask your doctor. Chickenpox vaccine may be given at the same time as other vaccines. Note: A \"combination\" vaccine called MMRV, which contains both chickenpox and MMR and vaccines, may be given instead of the two individual vaccines to people 15years of age and younger. Some people should not get chickenpox vaccine or should wait  · People should not get chickenpox vaccine if they have ever had a life-threatening allergic reaction to a previous dose of chickenpox vaccine or to gelatin or the antibiotic neomycin. · People who are moderately or severely ill at the time the shot is scheduled should usually wait until they recover before getting chickenpox vaccine. · Pregnant women should wait to get chickenpox vaccine until after they have given birth. Women should not get pregnant for 1 month after getting chickenpox vaccine. · Some people should check with their doctor about whether they should get chickenpox vaccine, including anyone who:  ¨ Has HIV/AIDS or another disease that affects the immune system. ¨ Is being treated with drugs that affect the immune system, such as steroids, for 2 weeks or longer. ¨ Has any kind of cancer. ¨ Is getting cancer treatment with radiation or drugs. · People who recently had a transfusion or were given other blood products should ask their doctor when they may get chickenpox vaccine. Ask your doctor for more information. What are the risks from chickenpox vaccine? A vaccine, like any medicine, is capable of causing serious problems, such as severe allergic reactions.  The risk of chickenpox vaccine causing serious harm, or death, is extremely small. Getting chickenpox vaccine is much safer than getting chickenpox disease. Most people who get chickenpox vaccine do not have any problems with it. Reactions are usually more likely after the first dose than after the second. Mild problems  · Soreness or swelling where the shot was given (about 1 out of 5 children and up to 1 out of 3 adolescents and adults)  · Fever (1 person out of 10, or less)  · Mild rash, up to a month after vaccination (1 person out of 25). It is possible for these people to infect other members of their household, but this is extremely rare. Moderate problems  · Seizure (jerking or staring) caused by fever (very rare)  Severe problems  · Pneumonia (very rare)  Other serious problems, including severe brain reactions and low blood count, have been reported after chickenpox vaccination. These happen so rarely experts cannot tell whether they are caused by the vaccine or not. If they are, it is extremely rare. Note: The first dose of MMRV vaccine has been associated with rash and higher rates of fever than MMR and varicella vaccines given separately. Rash has been reported in about 1 person in 20 and fever in about 1 person in 5. Seizures caused by a fever are also reported more often after MMRV. These usually occur 5-12 days after the first dose. What if there is a serious reaction? What should I look for? · Look for anything that concerns you, such as signs of a severe allergic reaction, very high fever, or behavior changes. Signs of a severe allergic reaction can include hives, swelling of the face and throat, difficulty breathing, a fast heartbeat, dizziness, and weakness. These would start a few minutes to a few hours after the vaccination. What should I do?   · If you think it is a severe allergic reaction or other emergency that can't wait, call 9-1-1 or get the person to the nearest hospital. Otherwise, call your doctor. · Afterward, the reaction should be reported to the Vaccine Adverse Event Reporting System (VAERS). Your doctor might file this report, or you can do it yourself through the VAERS web site at www.vaers. [facility].gov, or by calling 8-148.634.1923. VAERS is only for reporting reactions. They do not give medical advice. The National Vaccine Injury Compensation Program  The National Vaccine Injury Compensation Program (VICP) is a federal program that was created to compensate people who may have been injured by certain vaccines. Persons who believe they may have been injured by a vaccine can learn about the program and about filing a claim by calling 2-539.738.6083 or visiting the [employer] website at www.[facility].gov/vaccinecompensation. How can I learn more? · Ask your doctor. · Call your local or state health department. · Contact the Centers for Disease Control and Prevention (CDC):  ¨ Call 9-852.400.8555 (1-800-CDC-INFO) or  ¨ Visit CDC's website at www.cdc.gov/vaccines  Vaccine Information Statement (Interim)  Varicella Vaccine  (3/13/2008)  42 [address] 274XA-47  Department of Health and Human Services  Centers for Disease Control and Prevention  Many Vaccine Information Statements are available in Ukrainian and other languages. See www.immunize.org/vis. Muchas hojas de información sobre vacunas están disponibles en español y en otros idiomas. Visite www.immunize.org/vis. Care instructions adapted under license by [employer] (which disclaims liability or warranty for this information).  If you have questions about a medical condition or this instruction, always ask your healthcare professional. [place] [address] any warranty or liability for your use of this information.

## 2017-10-11 ENCOUNTER — TELEPHONE (OUTPATIENT)
Dept: PEDIATRICS CLINIC | Age: 22
End: 2017-10-11

## 2017-10-11 NOTE — TELEPHONE ENCOUNTER
----- Message from Lenora Sparks sent at 10/11/2017  7:26 AM EDT -----  Regarding: Dr Ryan Prior / refill  Aletha Shelton (parent) called stating the Rx for \"Aquaphor\" is not covered by the insurance provider. She is requesting a Rx that would be covered by the insurance. Best contact [phone].

## 2017-10-12 NOTE — TELEPHONE ENCOUNTER
Called guardian back and told her to try Vaseline instead; guardian also requested a letter for the [facility] stating pt needs more school hours at home; pt is receiving 2 hours per week and feels that pt should be receiving more; told mother I would let Dr. Amy Martin know and call back

## 2017-11-06 ENCOUNTER — CLINICAL SUPPORT (OUTPATIENT)
Dept: PEDIATRICS CLINIC | Age: 22
End: 2017-11-06

## 2017-11-06 DIAGNOSIS — Q93.4 CRI-DU-CHAT SYNDROME: Primary | ICD-10-CM

## 2017-11-06 DIAGNOSIS — Z20.9 EXPOSURE TO POTENTIAL INFECTION: ICD-10-CM

## 2017-11-07 LAB
BASOPHILS # BLD AUTO: 0.1 X10E3/UL (ref 0–0.2)
BASOPHILS NFR BLD AUTO: 1 %
EOSINOPHIL # BLD AUTO: 0.2 X10E3/UL (ref 0–0.4)
EOSINOPHIL NFR BLD AUTO: 3 %
ERYTHROCYTE [DISTWIDTH] IN BLOOD BY AUTOMATED COUNT: 13.3 % (ref 12.3–15.4)
HCT VFR BLD AUTO: 39.4 % (ref 37.5–51)
HGB BLD-MCNC: 13.6 G/DL (ref 12.6–17.7)
IMM GRANULOCYTES # BLD: 0 X10E3/UL (ref 0–0.1)
IMM GRANULOCYTES NFR BLD: 0 %
LYMPHOCYTES # BLD AUTO: 2.6 X10E3/UL (ref 0.7–3.1)
LYMPHOCYTES NFR BLD AUTO: 32 %
MCH RBC QN AUTO: 29.5 PG (ref 26.6–33)
MCHC RBC AUTO-ENTMCNC: 34.5 G/DL (ref 31.5–35.7)
MCV RBC AUTO: 86 FL (ref 79–97)
MONOCYTES # BLD AUTO: 0.8 X10E3/UL (ref 0.1–0.9)
MONOCYTES NFR BLD AUTO: 10 %
NEUTROPHILS # BLD AUTO: 4.5 X10E3/UL (ref 1.4–7)
NEUTROPHILS NFR BLD AUTO: 54 %
PLATELET # BLD AUTO: 234 X10E3/UL (ref 150–379)
RBC # BLD AUTO: 4.61 X10E6/UL (ref 4.14–5.8)
WBC # BLD AUTO: 8.3 X10E3/UL (ref 3.4–10.8)

## 2017-11-08 ENCOUNTER — TELEPHONE (OUTPATIENT)
Dept: PEDIATRICS CLINIC | Age: 22
End: 2017-11-08

## 2017-11-08 NOTE — TELEPHONE ENCOUNTER
Pt mom calling to see if a letter can be written to say he can be home schooled for the next 7 months. She was told by [employer] it was recommended to have a letter statement that the doctor agreed it was a good idea. He will graduate this year and is doing well with the home school so far.

## 2017-11-08 NOTE — PROGRESS NOTES
Lianet Devries is a 23 y.o. male  Chief Complaint   Patient presents with    Labs Only     Lab attempted to be drawn but unsuccessful. Patient referred to [facility] for labs.

## 2017-11-09 LAB
ALBUMIN SERPL-MCNC: 4.7 G/DL (ref 3.5–5.5)
ALBUMIN/GLOB SERPL: 1.3 {RATIO} (ref 1.2–2.2)
ALP SERPL-CCNC: 89 IU/L (ref 39–117)
ALT SERPL-CCNC: 10 IU/L (ref 0–44)
AST SERPL-CCNC: 17 IU/L (ref 0–40)
BILIRUB SERPL-MCNC: 0.5 MG/DL (ref 0–1.2)
BUN SERPL-MCNC: 15 MG/DL (ref 6–20)
BUN/CREAT SERPL: 17 (ref 9–20)
CALCIUM SERPL-MCNC: 9.8 MG/DL (ref 8.7–10.2)
CHLORIDE SERPL-SCNC: 101 MMOL/L (ref 96–106)
CO2 SERPL-SCNC: 23 MMOL/L (ref 18–29)
CREAT SERPL-MCNC: 0.86 MG/DL (ref 0.76–1.27)
GLOBULIN SER CALC-MCNC: 3.6 G/DL (ref 1.5–4.5)
GLUCOSE SERPL-MCNC: 97 MG/DL (ref 65–99)
H PYLORI IGA SER-ACNC: <9 UNITS (ref 0–8.9)
H PYLORI IGG SER IA-ACNC: <0.9 U/ML (ref 0–0.8)
H PYLORI IGM SER-ACNC: <9 UNITS (ref 0–8.9)
HIV 2 AB SERPL QL IA: NEGATIVE
POTASSIUM SERPL-SCNC: 4.1 MMOL/L (ref 3.5–5.2)
PROT SERPL-MCNC: 8.3 G/DL (ref 6–8.5)
SODIUM SERPL-SCNC: 141 MMOL/L (ref 134–144)

## 2017-11-10 ENCOUNTER — TELEPHONE (OUTPATIENT)
Dept: PEDIATRICS CLINIC | Age: 22
End: 2017-11-10

## 2017-11-16 ENCOUNTER — TELEPHONE (OUTPATIENT)
Dept: PEDIATRICS CLINIC | Age: 22
End: 2017-11-16

## 2017-12-04 ENCOUNTER — TELEPHONE (OUTPATIENT)
Dept: PEDIATRICS CLINIC | Age: 22
End: 2017-12-04

## 2018-01-01 ENCOUNTER — OFFICE VISIT (OUTPATIENT)
Dept: FAMILY MEDICINE CLINIC | Age: 23
End: 2018-01-01

## 2018-01-01 ENCOUNTER — OFFICE VISIT (OUTPATIENT)
Dept: NEUROLOGY | Age: 23
End: 2018-01-01

## 2018-01-01 ENCOUNTER — DOCUMENTATION ONLY (OUTPATIENT)
Dept: FAMILY MEDICINE CLINIC | Age: 23
End: 2018-01-01

## 2018-01-01 ENCOUNTER — TELEPHONE (OUTPATIENT)
Dept: PEDIATRICS CLINIC | Age: 23
End: 2018-01-01

## 2018-01-01 ENCOUNTER — TELEPHONE (OUTPATIENT)
Dept: FAMILY MEDICINE CLINIC | Age: 23
End: 2018-01-01

## 2018-01-01 VITALS
HEIGHT: 60 IN | TEMPERATURE: 96 F | DIASTOLIC BLOOD PRESSURE: 80 MMHG | HEART RATE: 70 BPM | SYSTOLIC BLOOD PRESSURE: 117 MMHG | WEIGHT: 106 LBS | BODY MASS INDEX: 20.81 KG/M2 | RESPIRATION RATE: 20 BRPM | OXYGEN SATURATION: 98 %

## 2018-01-01 VITALS
OXYGEN SATURATION: 96 % | HEART RATE: 73 BPM | WEIGHT: 106 LBS | SYSTOLIC BLOOD PRESSURE: 118 MMHG | BODY MASS INDEX: 20.81 KG/M2 | HEIGHT: 60 IN | DIASTOLIC BLOOD PRESSURE: 70 MMHG

## 2018-01-01 VITALS
WEIGHT: 108 LBS | TEMPERATURE: 95 F | BODY MASS INDEX: 21.2 KG/M2 | SYSTOLIC BLOOD PRESSURE: 121 MMHG | RESPIRATION RATE: 20 BRPM | OXYGEN SATURATION: 98 % | HEART RATE: 61 BPM | DIASTOLIC BLOOD PRESSURE: 84 MMHG | HEIGHT: 60 IN

## 2018-01-01 DIAGNOSIS — Q93.4 CRI-DU-CHAT SYNDROME: ICD-10-CM

## 2018-01-01 DIAGNOSIS — R46.89 AGGRESSIVE BEHAVIOR: ICD-10-CM

## 2018-01-01 DIAGNOSIS — Z23 ENCOUNTER FOR IMMUNIZATION: ICD-10-CM

## 2018-01-01 DIAGNOSIS — E55.9 VITAMIN D DEFICIENCY: Primary | ICD-10-CM

## 2018-01-01 DIAGNOSIS — L30.9 ECZEMA, UNSPECIFIED TYPE: ICD-10-CM

## 2018-01-01 DIAGNOSIS — Z13.29 SCREENING FOR THYROID DISORDER: ICD-10-CM

## 2018-01-01 DIAGNOSIS — F51.01 PRIMARY INSOMNIA: ICD-10-CM

## 2018-01-01 DIAGNOSIS — E55.9 VITAMIN D DEFICIENCY: ICD-10-CM

## 2018-01-01 DIAGNOSIS — E78.5 DYSLIPIDEMIA (HIGH LDL; LOW HDL): ICD-10-CM

## 2018-01-01 DIAGNOSIS — D50.9 NORMOCYTIC HYPOCHROMIC ANEMIA: ICD-10-CM

## 2018-01-01 DIAGNOSIS — F79 MR (MENTAL RETARDATION): Primary | ICD-10-CM

## 2018-01-01 DIAGNOSIS — R45.1 AGITATION: Primary | ICD-10-CM

## 2018-01-01 LAB
25(OH)D3+25(OH)D2 SERPL-MCNC: 12.2 NG/ML (ref 30–100)
ALBUMIN SERPL-MCNC: 4.6 G/DL (ref 3.5–5.5)
ALBUMIN/GLOB SERPL: 1.4 {RATIO} (ref 1.2–2.2)
ALP SERPL-CCNC: 82 IU/L (ref 39–117)
ALT SERPL-CCNC: 8 IU/L (ref 0–44)
AST SERPL-CCNC: 17 IU/L (ref 0–40)
BASOPHILS # BLD AUTO: 0 X10E3/UL (ref 0–0.2)
BASOPHILS NFR BLD AUTO: 1 %
BILIRUB SERPL-MCNC: 0.3 MG/DL (ref 0–1.2)
BUN SERPL-MCNC: 14 MG/DL (ref 6–20)
BUN/CREAT SERPL: 16 (ref 9–20)
CALCIUM SERPL-MCNC: 9.4 MG/DL (ref 8.7–10.2)
CHLORIDE SERPL-SCNC: 100 MMOL/L (ref 96–106)
CHOLEST SERPL-MCNC: 117 MG/DL (ref 100–199)
CO2 SERPL-SCNC: 22 MMOL/L (ref 20–29)
CREAT SERPL-MCNC: 0.86 MG/DL (ref 0.76–1.27)
EOSINOPHIL # BLD AUTO: 0.2 X10E3/UL (ref 0–0.4)
EOSINOPHIL NFR BLD AUTO: 2 %
ERYTHROCYTE [DISTWIDTH] IN BLOOD BY AUTOMATED COUNT: 13.1 % (ref 12.3–15.4)
GLOBULIN SER CALC-MCNC: 3.3 G/DL (ref 1.5–4.5)
GLUCOSE SERPL-MCNC: 105 MG/DL (ref 65–99)
HCT VFR BLD AUTO: 42.7 % (ref 37.5–51)
HDLC SERPL-MCNC: 41 MG/DL
HGB BLD-MCNC: 14.3 G/DL (ref 13–17.7)
IMM GRANULOCYTES # BLD: 0 X10E3/UL (ref 0–0.1)
IMM GRANULOCYTES NFR BLD: 0 %
LDLC SERPL CALC-MCNC: 65 MG/DL (ref 0–99)
LYMPHOCYTES # BLD AUTO: 2.4 X10E3/UL (ref 0.7–3.1)
LYMPHOCYTES NFR BLD AUTO: 40 %
MCH RBC QN AUTO: 29.6 PG (ref 26.6–33)
MCHC RBC AUTO-ENTMCNC: 33.5 G/DL (ref 31.5–35.7)
MCV RBC AUTO: 88 FL (ref 79–97)
MONOCYTES # BLD AUTO: 0.8 X10E3/UL (ref 0.1–0.9)
MONOCYTES NFR BLD AUTO: 13 %
NEUTROPHILS # BLD AUTO: 2.7 X10E3/UL (ref 1.4–7)
NEUTROPHILS NFR BLD AUTO: 44 %
PLATELET # BLD AUTO: 324 X10E3/UL (ref 150–379)
POTASSIUM SERPL-SCNC: 3.7 MMOL/L (ref 3.5–5.2)
PROT SERPL-MCNC: 7.9 G/DL (ref 6–8.5)
RBC # BLD AUTO: 4.83 X10E6/UL (ref 4.14–5.8)
SODIUM SERPL-SCNC: 140 MMOL/L (ref 134–144)
TRIGL SERPL-MCNC: 57 MG/DL (ref 0–149)
TSH SERPL DL<=0.005 MIU/L-ACNC: 4.35 UIU/ML (ref 0.45–4.5)
VLDLC SERPL CALC-MCNC: 11 MG/DL (ref 5–40)
WBC # BLD AUTO: 6.2 X10E3/UL (ref 3.4–10.8)

## 2018-01-01 RX ORDER — TRAZODONE HYDROCHLORIDE 50 MG/1
50 TABLET ORAL
Qty: 30 TAB | Refills: 1 | Status: SHIPPED | OUTPATIENT
Start: 2018-01-01 | End: 2018-01-01 | Stop reason: SDUPTHER

## 2018-01-01 RX ORDER — CHOLECALCIFEROL TAB 125 MCG (5000 UNIT) 125 MCG
5000 TAB ORAL DAILY
Qty: 90 TAB | Refills: 1 | Status: SHIPPED | OUTPATIENT
Start: 2018-01-01

## 2018-01-01 RX ORDER — CHOLECALCIFEROL TAB 125 MCG (5000 UNIT) 125 MCG
5000 TAB ORAL DAILY
Qty: 90 TAB | Refills: 1 | Status: SHIPPED | OUTPATIENT
Start: 2018-01-01 | End: 2018-01-01 | Stop reason: SDUPTHER

## 2018-01-01 RX ORDER — TRIAMCINOLONE ACETONIDE 1 MG/G
OINTMENT TOPICAL 2 TIMES DAILY
Qty: 30 G | Refills: 2 | Status: SHIPPED | OUTPATIENT
Start: 2018-01-01

## 2018-01-01 RX ORDER — LORAZEPAM 0.5 MG/1
TABLET ORAL
Qty: 40 TAB | Refills: 5 | Status: SHIPPED | OUTPATIENT
Start: 2018-01-01

## 2018-01-01 RX ORDER — TRAZODONE HYDROCHLORIDE 50 MG/1
50 TABLET ORAL
Qty: 30 TAB | Refills: 5 | Status: SHIPPED | OUTPATIENT
Start: 2018-01-01

## 2018-02-09 NOTE — PROGRESS NOTES
CBC, CMP -- wnl; HIV, H.pylori both (-); please inform patient's guardian (sister) Statement Selected

## 2018-02-10 ENCOUNTER — APPOINTMENT (OUTPATIENT)
Dept: GENERAL RADIOLOGY | Age: 23
End: 2018-02-10
Attending: EMERGENCY MEDICINE
Payer: MEDICAID

## 2018-02-10 ENCOUNTER — HOSPITAL ENCOUNTER (EMERGENCY)
Age: 23
Discharge: HOME | End: 2018-02-10
Attending: EMERGENCY MEDICINE
Payer: MEDICAID

## 2018-02-10 VITALS
RESPIRATION RATE: 12 BRPM | DIASTOLIC BLOOD PRESSURE: 83 MMHG | SYSTOLIC BLOOD PRESSURE: 123 MMHG | WEIGHT: 103.4 LBS | OXYGEN SATURATION: 98 % | HEART RATE: 93 BPM | TEMPERATURE: 98.6 F | BODY MASS INDEX: 20.19 KG/M2

## 2018-02-10 DIAGNOSIS — J06.9 VIRAL URI WITH COUGH: Primary | ICD-10-CM

## 2018-02-10 LAB — DEPRECATED S PYO AG THROAT QL EIA: NEGATIVE

## 2018-02-10 PROCEDURE — 87880 STREP A ASSAY W/OPTIC: CPT | Performed by: EMERGENCY MEDICINE

## 2018-02-10 PROCEDURE — 99282 EMERGENCY DEPT VISIT SF MDM: CPT

## 2018-02-10 PROCEDURE — 71046 X-RAY EXAM CHEST 2 VIEWS: CPT

## 2018-02-10 PROCEDURE — 87070 CULTURE OTHR SPECIMN AEROBIC: CPT | Performed by: EMERGENCY MEDICINE

## 2018-02-10 RX ORDER — OSELTAMIVIR PHOSPHATE 75 MG/1
75 CAPSULE ORAL 2 TIMES DAILY
Qty: 10 CAP | Refills: 0 | Status: SHIPPED | OUTPATIENT
Start: 2018-02-10 | End: 2018-02-15

## 2018-02-10 NOTE — ED NOTES
Assumed care of patient. Patient placed in position of comfort. Call bell in reach. Skin warm and dry. Respirations even and unlabored. In no apparent distress at this time. Pt presents to the ED accompanied by his sister for c/o lethargy and decreased PO x 2 days. Upon ED arrival, pt eating chips without difficulty, but refuses to drink liquids. Pt refusing IV access at this time-Dr. Ricardo Beyer is aware and states the pt does not need access at this time.

## 2018-02-10 NOTE — ED PROVIDER NOTES
EMERGENCY DEPARTMENT HISTORY AND PHYSICAL EXAM      Date: 2/10/2018  Patient Name: Abiola Blanc    History of Presenting Illness     Chief Complaint   Patient presents with    Lethargy    Decreased Appetite     since Friday       History Provided By: Patient and Patient's Sister    HPI: Abiola Blanc, 23 y.o. male with PMHx significant for Down's syndrome, presents ambulatory to the ED with cc of mild to moderately severe and constant fever x yesterday morning, per family member. She reports giving the pt 30 mg Tylenol yesterday morning and this morning for tx of fever. She also reports diarrhea early this morning, increased fatigue, cough with sputum, and diaphoresis. She specifically denies any vomiting. Family notes that a new aide came to visit this week, who was also ill with intermittent cough and cold like sx. Sister denies any h/o HTN or DM. PCP: Andrés Goins MD    There are no other complaints, changes, or physical findings at this time. 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\"       Past Surgical History:  Past Surgical History:   Procedure Laterality Date    HX OTHER SURGICAL      sister states that he had a cecostomy years ago and it was reversed       Family History:  Family History   Problem Relation Age of Onset    Hypertension Mother     Asthma Maternal Grandmother        Social History:  Social History   Substance Use Topics    Smoking status: Never Smoker    Smokeless tobacco: Never Used    Alcohol use No       Allergies: Allergies   Allergen Reactions    Milk Other (comments)     Mother states that he has a reaction to dairy, diarrhea, gas, and vomiting.  Pork Derived (Porcine) Other (comments)     Mother states that he has a reaction to pork including diarrhea, gas, and vomiting.     Tomato Other (comments)     Intolerance, stomach upset         Review of Systems   Review of Systems   Constitutional: Positive for appetite change, diaphoresis, fatigue and fever. Negative for activity change and chills. HENT: Positive for rhinorrhea. Negative for congestion and sore throat. Eyes: Negative for pain and redness. Respiratory: Positive for cough. Negative for chest tightness and shortness of breath. Cardiovascular: Negative for chest pain and palpitations. Gastrointestinal: Positive for diarrhea. Negative for abdominal pain, nausea and vomiting. Genitourinary: Negative for dysuria, frequency and urgency. Musculoskeletal: Negative for back pain and neck pain. Skin: Negative for rash. Neurological: Negative for syncope, light-headedness and headaches. Psychiatric/Behavioral: Negative for confusion. All other systems reviewed and are negative. Physical Exam   Physical Exam   Constitutional: He appears well-developed and well-nourished. No distress. HENT:   Head: Normocephalic. Nose: Nose normal.   Mouth/Throat: Oropharynx is clear and moist. No oropharyngeal exudate. Eyes: Conjunctivae are normal. Pupils are equal, round, and reactive to light. No scleral icterus. Neck: Normal range of motion. Neck supple. No JVD present. No tracheal deviation present. No thyromegaly present. Cardiovascular: Normal rate, regular rhythm and intact distal pulses. Exam reveals no gallop and no friction rub. No murmur heard. Pulmonary/Chest: Effort normal and breath sounds normal. No stridor. No respiratory distress. He has no wheezes. He has no rales. Abdominal: Soft. Bowel sounds are normal. He exhibits no distension. There is no tenderness. There is no rebound and no guarding. Musculoskeletal: Normal range of motion. He exhibits no edema. Lymphadenopathy:     He has no cervical adenopathy. Neurological: He is alert. No cranial nerve deficit. He exhibits normal muscle tone.  Coordination normal.   Alert with eye open. Nonverbal. Moves all extremities; exam ltd due to baseline mental status and physical condition;    Skin: Skin is warm and dry. No rash noted. He is not diaphoretic. No erythema. Psychiatric: He has a normal mood and affect. Calm, pleasant   Nursing note and vitals reviewed. Diagnostic Study Results     Labs -     Recent Results (from the past 12 hour(s))   STREP AG SCREEN, GROUP A    Collection Time: 02/10/18  2:30 PM   Result Value Ref Range    Group A Strep Ag ID NEGATIVE  NEG         Radiologic Studies -   XR CHEST PA LAT   Final Result        CT Results  (Last 48 hours)    None        CXR Results  (Last 48 hours)               02/10/18 1439  XR CHEST PA LAT Final result    Impression:  Impression:   1. No acute cardiopulmonary disease           Narrative:  INDICATION:  lethargy        Exam: Chest 2 views. Comparison: None. Findings: Cardiomediastinal silhouette is normal. Pulmonary vasculature is not   engorged. No focal parenchymal opacities, effusions, or pneumothorax. Bony   thorax is intact. Medical Decision Making   I am the first provider for this patient. I reviewed the vital signs, available nursing notes, past medical history, past surgical history, family history and social history. Vital Signs-Reviewed the patient's vital signs. Patient Vitals for the past 12 hrs:   Temp Pulse Resp BP SpO2   02/10/18 1352 98.6 °F (37 °C) 93 12 123/83 98 %       Pulse Oximetry Analysis - 98% on RA    Cardiac Monitor:   Rate: 90 bpm  Rhythm: Normal Sinus Rhythm      Records Reviewed: Nursing Notes    Provider Notes (Medical Decision Making): DDx: strep, flu, dehydration, viral syndrome, PNA     ED Course:   Initial assessment performed. The patient's presenting problems have been discussed with the parent/guardian, who is in agreement with the care plan formulated and outlined with them. I have encouraged them to ask questions as they arise throughout the ED visit. 2:11 PM  Patient tolerated PO intake without any difficulty. Written by Pam Duke, ED Scribe, as dictated by Ana Paula Kauffman MD.       2:50 PM  Flu screen cancelled d/t ER shortage and need for lab send out. Will tx empirically for flu with Tamiflu. CXR and strep screen both negative. Written by Pam Duke, ED Scribe, as dictated by Ana Paula Kauffman MD.      Critical Care Time: none    Disposition:    Discharge Note:  4:03 PM  The pt is ready for discharge. The pt's signs, symptoms, diagnosis, and discharge instructions have been discussed with the pt's family or caregiver and the pt's family or caregiver has conveyed their understanding. The pt is to follow up as recommended or return to ER should their symptoms worsen. Plan has been discussed and pt's family or caregiver is in agreement. Pt well appearing; afebrile here; vs wnl; clear cxr; strep negative; hospital out of flu swabs; will treat with tamiflu; pt tolerating po without difficulty in er; Ana Paula Kauffman MD      PLAN:  1. Discharge Medication List as of 2/10/2018  4:02 PM      START taking these medications    Details   oseltamivir (TAMIFLU) 75 mg capsule Take 1 Cap by mouth two (2) times a day for 5 days. , Normal, Disp-10 Cap, R-0           2. Follow-up Information     Follow up With Details Comments 60Kenneth Nj MD Schedule an appointment as soon as possible for a visit in 2 days  [address] [place] [address]  [address] [place] [address]  [phone]      [facility] EMERGENCY DEPT Go in 1 day If symptoms worsen [address] [place] [address]  [phone]        Return to ED if worse     Diagnosis     Clinical Impression:   1. Viral URI with cough        Attestations: This note is prepared by Pam Duke, acting as Scribe for Ana Paula Kauffman MD.       The scribe's documentation has been prepared under my direction and personally reviewed by me in its entirety.  I confirm that the note above accurately reflects all work, treatment, procedures, and medical decision making performed by me.

## 2018-02-10 NOTE — ED NOTES
Discharge instructions given to patient's sister/caregiver by MD Sepideh Blake. Verbalized understanding of instructions. Patient discharged without difficulty. Patient discharged in stable condition via ambulation, declines wheelchair accompanied by sister. Pt refusing to have vital signs repeated prior to discharge.

## 2018-02-10 NOTE — ED NOTES
Bedside and Verbal shift change report given to Micaela Perea RN and Ty Humphrey RN (oncoming nurse) by this RN (offgoing nurse). Report included the following information SBAR.

## 2018-02-10 NOTE — DISCHARGE INSTRUCTIONS
Upper Respiratory Infection (Cold): Care Instructions  Your Care Instructions    An upper respiratory infection, or URI, is an infection of the nose, sinuses, or throat. URIs are spread by coughs, sneezes, and direct contact. The common cold is the most frequent kind of URI. The flu and sinus infections are other kinds of URIs. Almost all URIs are caused by viruses. Antibiotics won't cure them. But you can treat most infections with home care. This may include drinking lots of fluids and taking over-the-counter pain medicine. You will probably feel better in 4 to 10 days. The doctor has checked you carefully, but problems can develop later. If you notice any problems or new symptoms, get medical treatment right away. Follow-up care is a key part of your treatment and safety. Be sure to make and go to all appointments, and call your doctor if you are having problems. It's also a good idea to know your test results and keep a list of the medicines you take. How can you care for yourself at home? · To prevent dehydration, drink plenty of fluids, enough so that your urine is light yellow or clear like water. Choose water and other caffeine-free clear liquids until you feel better. If you have kidney, heart, or liver disease and have to limit fluids, talk with your doctor before you increase the amount of fluids you drink. · Take an over-the-counter pain medicine, such as acetaminophen (Tylenol), ibuprofen (Advil, Motrin), or naproxen (Aleve). Read and follow all instructions on the label. · Before you use cough and cold medicines, check the label. These medicines may not be safe for young children or for people with certain health problems. · Be careful when taking over-the-counter cold or flu medicines and Tylenol at the same time. Many of these medicines have acetaminophen, which is Tylenol. Read the labels to make sure that you are not taking more than the recommended dose.  Too much acetaminophen (Tylenol) can be harmful. · Get plenty of rest.  · Do not smoke or allow others to smoke around you. If you need help quitting, talk to your doctor about stop-smoking programs and medicines. These can increase your chances of quitting for good. When should you call for help? Call 911 anytime you think you may need emergency care. For example, call if:  ? · You have severe trouble breathing. ?Call your doctor now or seek immediate medical care if:  ? · You seem to be getting much sicker. ? · You have new or worse trouble breathing. ? · You have a new or higher fever. ? · You have a new rash. ? Watch closely for changes in your health, and be sure to contact your doctor if:  ? · You have a new symptom, such as a sore throat, an earache, or sinus pain. ? · You cough more deeply or more often, especially if you notice more mucus or a change in the color of your mucus. ? · You do not get better as expected. Where can you learn more? Go to http://kenia-halima.info/. Enter C733 in the search box to learn more about \"Upper Respiratory Infection (Cold): Care Instructions. \"  Current as of: May 12, 2017  Content Version: 11.4  © 9267-1884 Healthwise, Incorporated. Care instructions adapted under license by [employer] (which disclaims liability or warranty for this information). If you have questions about a medical condition or this instruction, always ask your healthcare professional. [place] [address] any warranty or liability for your use of this information.

## 2018-02-10 NOTE — ED NOTES
Bedside and Verbal shift change report given to So RN (oncoming nurse) by Joshua Hadley RN (offgoing nurse). Report included the following information SBAR, Kardex, ED Summary, [facility] and Recent Results.

## 2018-02-12 LAB
BACTERIA SPEC CULT: NORMAL
SERVICE CMNT-IMP: NORMAL

## 2018-02-19 ENCOUNTER — OFFICE VISIT (OUTPATIENT)
Dept: PEDIATRICS CLINIC | Age: 23
End: 2018-02-19

## 2018-02-19 VITALS
HEIGHT: 60 IN | BODY MASS INDEX: 20.81 KG/M2 | DIASTOLIC BLOOD PRESSURE: 75 MMHG | RESPIRATION RATE: 20 BRPM | TEMPERATURE: 95.8 F | HEART RATE: 82 BPM | WEIGHT: 106 LBS | SYSTOLIC BLOOD PRESSURE: 115 MMHG

## 2018-02-19 DIAGNOSIS — L02.416 CUTANEOUS ABSCESS OF LEFT LOWER EXTREMITY: ICD-10-CM

## 2018-02-19 DIAGNOSIS — J22 LOWER RESPIRATORY INFECTION: Primary | ICD-10-CM

## 2018-02-19 RX ORDER — CLINDAMYCIN PALMITATE HYDROCHLORIDE 75 MG/5ML
20 GRANULE, FOR SOLUTION ORAL 3 TIMES DAILY
Qty: 600 ML | Refills: 0 | Status: SHIPPED | OUTPATIENT
Start: 2018-02-19 | End: 2018-03-01

## 2018-02-19 RX ORDER — TRIAMCINOLONE ACETONIDE 1 MG/G
OINTMENT TOPICAL 2 TIMES DAILY
Qty: 30 G | Refills: 2 | Status: SHIPPED | OUTPATIENT
Start: 2018-02-19 | End: 2018-01-01 | Stop reason: SDUPTHER

## 2018-02-19 RX ORDER — GUAIFENESIN 100 MG/5ML
200 SOLUTION ORAL
Qty: 120 ML | Refills: 2 | Status: SHIPPED | OUTPATIENT
Start: 2018-02-19 | End: 2018-01-01 | Stop reason: ALTCHOICE

## 2018-02-19 NOTE — PROGRESS NOTES
1. Have you been to the ER, urgent care clinic since your last visit? Hospitalized since your last visit? Yes Where: ER for fever, lethargy, diarrhea; Rx Tamiflu    2. Have you seen or consulted any other health care providers outside of the [address] [place] [address] since your last visit? Include any pap smears or colon screening.  No    Chief Complaint   Patient presents with    Follow-up     ER     Visit Vitals    /75    Pulse 82    Temp 95.8 °F (35.4 °C) (Axillary)    Resp 20    Ht 5' (1.524 m)    Wt 106 lb (48.1 kg)    BMI 20.7 kg/m2     Guardian states pt is doing better since ER visit but is having trouble coughing up mucus  Guardian denies fever, decrease in appetite  Guardian wants to know if can call in cough syrup

## 2018-02-19 NOTE — PATIENT INSTRUCTIONS
START Clindamycin, 3 TIMES DAILY x 10 DAYS    Can use Guaifenesin Syrup, 3 TIMES DAILY, as needed, for cough    RECHECK in 2 WEEKS if cough is not improving (sooner if it is worsening or fever recurs)           Cough: Care Instructions  Your Care Instructions    A cough is your body's response to something that bothers your throat or airways. Many things can cause a cough. You might cough because of a cold or the flu, bronchitis, or asthma. Smoking, postnasal drip, allergies, and stomach acid that backs up into your throat also can cause coughs. A cough is a symptom, not a disease. Most coughs stop when the cause, such as a cold, goes away. You can take a few steps at home to cough less and feel better. Follow-up care is a key part of your treatment and safety. Be sure to make and go to all appointments, and call your doctor if you are having problems. It's also a good idea to know your test results and keep a list of the medicines you take. How can you care for yourself at home? · Drink lots of water and other fluids. This helps thin the mucus and soothes a dry or sore throat. Honey or lemon juice in hot water or tea may ease a dry cough. · Take cough medicine as directed by your doctor. · Prop up your head on pillows to help you breathe and ease a dry cough. · Try cough drops to soothe a dry or sore throat. Cough drops don't stop a cough. Medicine-flavored cough drops are no better than candy-flavored drops or hard candy. · Do not smoke. Avoid secondhand smoke. If you need help quitting, talk to your doctor about stop-smoking programs and medicines. These can increase your chances of quitting for good. When should you call for help? Call 911 anytime you think you may need emergency care. For example, call if:  ? · You have severe trouble breathing. ?Call your doctor now or seek immediate medical care if:  ? · You cough up blood. ? · You have new or worse trouble breathing.    ? · You have a new or higher fever. ? · You have a new rash. ? Watch closely for changes in your health, and be sure to contact your doctor if:  ? · You cough more deeply or more often, especially if you notice more mucus or a change in the color of your mucus. ? · You have new symptoms, such as a sore throat, an earache, or sinus pain. ? · You do not get better as expected. Where can you learn more? Go to http://kenia-halima.info/. Enter D279 in the search box to learn more about \"Cough: Care Instructions. \"  Current as of: May 12, 2017  Content Version: 11.4  © 7994-9993 [employer]. Care instructions adapted under license by [employer] (which disclaims liability or warranty for this information). If you have questions about a medical condition or this instruction, always ask your healthcare professional. [address] any warranty or liability for your use of this information. Skin Abscess: Care Instructions  Your Care Instructions    A skin abscess is a bacterial infection that forms a pocket of pus. A boil is a kind of skin abscess. The doctor may have cut an opening in the abscess so that the pus can drain out. You may have gauze in the cut so that the abscess will stay open and keep draining. You may need antibiotics. You will need to follow up with your doctor to make sure the infection has gone away. The doctor has checked you carefully, but problems can develop later. If you notice any problems or new symptoms, get medical treatment right away. Follow-up care is a key part of your treatment and safety. Be sure to make and go to all appointments, and call your doctor if you are having problems. It's also a good idea to know your test results and keep a list of the medicines you take. How can you care for yourself at home? · Apply warm and dry compresses, a heating pad set on low, or a hot water bottle 3 or 4 times a day for pain.  Keep a cloth between the heat source and your skin. · If your doctor prescribed antibiotics, take them as directed. Do not stop taking them just because you feel better. You need to take the full course of antibiotics. · Take pain medicines exactly as directed. ¨ If the doctor gave you a prescription medicine for pain, take it as prescribed. ¨ If you are not taking a prescription pain medicine, ask your doctor if you can take an over-the-counter medicine. · Keep your bandage clean and dry. Change the bandage whenever it gets wet or dirty, or at least one time a day. · If the abscess was packed with gauze:  ¨ Keep follow-up appointments to have the gauze changed or removed. If the doctor instructed you to remove the gauze, gently pull out all of the gauze when your doctor tells you to. ¨ After the gauze is removed, soak the area in warm water for 15 to 20 minutes 2 times a day, until the wound closes. When should you call for help? Call your doctor now or seek immediate medical care if:  ? · You have signs of worsening infection, such as:  ¨ Increased pain, swelling, warmth, or redness. ¨ Red streaks leading from the infected skin. ¨ Pus draining from the wound. ¨ A fever. ? Watch closely for changes in your health, and be sure to contact your doctor if:  ? · You do not get better as expected. Where can you learn more? Go to http://kenia-halima.info/. Enter T919 in the search box to learn more about \"Skin Abscess: Care Instructions. \"  Current as of: October 13, 2016  Content Version: 11.4  © 6980-2590 [employer]. Care instructions adapted under license by [employer] (which disclaims liability or warranty for this information). If you have questions about a medical condition or this instruction, always ask your healthcare professional. [address] any warranty or liability for your use of this information.

## 2018-02-19 NOTE — PROGRESS NOTES
HISTORY OF PRESENT ILLNESS  Hema Enrique is a 23 y.o. male. HPI  Here today for ER f/u, seen 9 days ago at ER, CXR was normal and flu test was negative. He was given an Rx for Tamiflu for empiric tx, but his sister said he didn't tolerate it well so she stopped it. He has a very productive cough noted in the office. He has been afebrile over the past week. His sister (guardian) also noted he has sensitive boils at his groin area (inner thigh). Also, she is requesting a different topical steroid, as the one previously ordered wasn't covered by their insurance anymore. Review of Systems   Constitutional: Negative for fever. HENT: Positive for congestion. Negative for nosebleeds and sore throat. Eyes: Negative for pain, discharge and redness. Respiratory: Positive for cough. Negative for shortness of breath and wheezing. Physical Exam   Constitutional: He appears well-developed and well-nourished. HENT:   Nose: Nose normal.   TMs are both obstructed with impacted cerumen. Pulmonary/Chest: Effort normal. No accessory muscle usage. No respiratory distress. There are no rales or wheezing noted, but he has a productive sounding, harsh cough   Skin:   At his upper, inner L thigh, there is a firm, small mass below 2 healing pustules, there is no warmth or redness locally. ASSESSMENT and PLAN    ICD-10-CM ICD-9-CM    1. Lower respiratory infection J22 519.8 clindamycin (CLEOCIN) 75 mg/5 mL solution      guaiFENesin (ROBITUSSIN) 100 mg/5 mL liquid   2.  Cutaneous abscess of left lower extremity L02.416 682.6 clindamycin (CLEOCIN) 75 mg/5 mL solution       START Clindamycin, 3 TIMES DAILY x 10 DAYS    Can use Guaifenesin Syrup, 3 TIMES DAILY, as needed, for cough    RECHECK in 2 WEEKS if cough is not improving (sooner if it is worsening or fever recurs)

## 2018-02-19 NOTE — MR AVS SNAPSHOT
[address] [place] [address] [facility] 
[phone] Patient: Santiago Cline MRN: [id number] :1995 Visit Information Date & Time Provider Department Dept. Phone Encounter #  
 2018  8:45 AM MELISA Dumont 14 [id number] Upcoming Health Maintenance Date Due  
 HPV AGE 9Y-34Y (1 of 3 - Male 3 Dose Series) 2008 DTaP/Tdap/Td series (5 - Td) 2017 Influenza Age 5 to Adult 2017 Hepatitis A Peds Age 1-18 (2 of 2 - Standard Series) 2018 Allergies as of 2018  Review Complete On: 2018 By: Luis Eden Severity Noted Reaction Type Reactions Milk  2015    Other (comments) Mother states that he has a reaction to dairy, diarrhea, gas, and vomiting. Pork Derived (Porcine)  2015    Other (comments) Mother states that he has a reaction to pork including diarrhea, gas, and vomiting. Tomato  2017    Other (comments) Intolerance, stomach upset Current Immunizations  Reviewed on 2015 Name Date DTaP 7/15/1998, 1/15/1998, 1997 Hep A Vaccine (Adult) 10/6/2017 Hep B Vaccine 3/11/1998, 1997, 1997 Hib 11/15/1998, 3/19/1998, 1/15/1998, 1997 MMR 2003, 1998 Meningococcal (MCV4O) Vaccine 10/6/2017 Meningococcal (MCV4P) Vaccine 2015 Meningococcal B (OMV) Vaccine 10/6/2017 Poliovirus vaccine 2009, 3/19/1998, 1998, 1997 Tdap 2007 Varicella Virus Vaccine 10/6/2017, 11/15/1998 Not reviewed this visit You Were Diagnosed With   
  
 Codes Comments Lower respiratory infection    -  Primary ICD-10-CM: Braden Molt ICD-9-CM: 519.8 Cutaneous abscess of left lower extremity     ICD-10-CM: L02.416 ICD-9-CM: 689. 6 Vitals BP Pulse Temp Resp Height(growth percentile) Weight(growth percentile) 115/75 82 95.8 °F (35.4 °C) (Axillary) 20 5' (1.524 m) 106 lb (48.1 kg) BMI Smoking Status 20.7 kg/m2 Never Smoker Vitals History BMI and BSA Data Body Mass Index Body Surface Area 20.7 kg/m 2 1.43 m 2 Preferred Pharmacy Pharmacy Name Phone [id number]- [facility]rav Ansari, [address] [place] [phone] Your Updated Medication List  
  
   
This list is accurate as of: 2/19/18 10:15 AM.  Always use your most recent med list.  
  
  
  
  
 clindamycin 75 mg/5 mL solution Commonly known as:  CLEOCIN Take 20 mL by mouth three (3) times daily for 10 days. guaiFENesin 100 mg/5 mL liquid Commonly known as:  ROBITUSSIN Take 10 mL by mouth three (3) times daily as needed for Cough. Prescriptions Sent to Pharmacy Refills  
 clindamycin (CLEOCIN) 75 mg/5 mL solution 0 Sig: Take 20 mL by mouth three (3) times daily for 10 days. Class: Normal  
 [facility] #: [phone] Route: Oral  
 guaiFENesin (ROBITUSSIN) 100 mg/5 mL liquid 2 Sig: Take 10 mL by mouth three (3) times daily as needed for Cough. Class: Normal  
 Pharmacy: [facility] Ph #: [phone] Route: Oral  
  
Patient Instructions START Clindamycin, 3 TIMES DAILY x 10 DAYS Can use Guaifenesin Syrup, 3 TIMES DAILY, as needed, for cough RECHECK in 2 WEEKS if cough is not improving (sooner if it is worsening or fever recurs) Cough: Care Instructions Your Care Instructions A cough is your body's response to something that bothers your throat or airways. Many things can cause a cough. You might cough because of a cold or the flu, bronchitis, or asthma. Smoking, postnasal drip, allergies, and stomach acid that backs up into your throat also can cause coughs. A cough is a symptom, not a disease. Most coughs stop when the cause, such as a cold, goes away. You can take a few steps at home to cough less and feel better. Follow-up care is a key part of your treatment and safety. Be sure to make and go to all appointments, and call your doctor if you are having problems. It's also a good idea to know your test results and keep a list of the medicines you take. How can you care for yourself at home? · Drink lots of water and other fluids. This helps thin the mucus and soothes a dry or sore throat. Honey or lemon juice in hot water or tea may ease a dry cough. · Take cough medicine as directed by your doctor. · Prop up your head on pillows to help you breathe and ease a dry cough. · Try cough drops to soothe a dry or sore throat. Cough drops don't stop a cough. Medicine-flavored cough drops are no better than candy-flavored drops or hard candy. · Do not smoke. Avoid secondhand smoke. If you need help quitting, talk to your doctor about stop-smoking programs and medicines. These can increase your chances of quitting for good. When should you call for help? Call 911 anytime you think you may need emergency care. For example, call if: 
? · You have severe trouble breathing. ?Call your doctor now or seek immediate medical care if: 
? · You cough up blood. ? · You have new or worse trouble breathing. ? · You have a new or higher fever. ? · You have a new rash. ? Watch closely for changes in your health, and be sure to contact your doctor if: 
? · You cough more deeply or more often, especially if you notice more mucus or a change in the color of your mucus. ? · You have new symptoms, such as a sore throat, an earache, or sinus pain. ? · You do not get better as expected. Where can you learn more? Go to http://kenia-halima.info/. Enter D279 in the search box to learn more about \"Cough: Care Instructions. \" Current as of: May 12, 2017 Content Version: 11.4 © 5351-3220 [employer]. Care instructions adapted under license by [employer] (which disclaims liability or warranty for this information). If you have questions about a medical condition or this instruction, always ask your healthcare professional. [address] any warranty or liability for your use of this information. Skin Abscess: Care Instructions Your Care Instructions A skin abscess is a bacterial infection that forms a pocket of pus. A boil is a kind of skin abscess. The doctor may have cut an opening in the abscess so that the pus can drain out. You may have gauze in the cut so that the abscess will stay open and keep draining. You may need antibiotics. You will need to follow up with your doctor to make sure the infection has gone away. The doctor has checked you carefully, but problems can develop later. If you notice any problems or new symptoms, get medical treatment right away. Follow-up care is a key part of your treatment and safety. Be sure to make and go to all appointments, and call your doctor if you are having problems. It's also a good idea to know your test results and keep a list of the medicines you take. How can you care for yourself at home? · Apply warm and dry compresses, a heating pad set on low, or a hot water bottle 3 or 4 times a day for pain. Keep a cloth between the heat source and your skin. · If your doctor prescribed antibiotics, take them as directed. Do not stop taking them just because you feel better. You need to take the full course of antibiotics. · Take pain medicines exactly as directed. ¨ If the doctor gave you a prescription medicine for pain, take it as prescribed. ¨ If you are not taking a prescription pain medicine, ask your doctor if you can take an over-the-counter medicine. · Keep your bandage clean and dry.  Change the bandage whenever it gets wet or dirty, or at least one time a day. · If the abscess was packed with gauze: 
¨ Keep follow-up appointments to have the gauze changed or removed. If the doctor instructed you to remove the gauze, gently pull out all of the gauze when your doctor tells you to. ¨ After the gauze is removed, soak the area in warm water for 15 to 20 minutes 2 times a day, until the wound closes. When should you call for help? Call your doctor now or seek immediate medical care if: 
? · You have signs of worsening infection, such as: 
¨ Increased pain, swelling, warmth, or redness. ¨ Red streaks leading from the infected skin. ¨ Pus draining from the wound. ¨ A fever. ? Watch closely for changes in your health, and be sure to contact your doctor if: 
? · You do not get better as expected. Where can you learn more? Go to http://kenia-halima.info/. Enter F510 in the search box to learn more about \"Skin Abscess: Care Instructions. \" Current as of: October 13, 2016 Content Version: 11.4 © 3132-6579 [employer]. Care instructions adapted under license by [employer] (which disclaims liability or warranty for this information). If you have questions about a medical condition or this instruction, always ask your healthcare professional. [address] any warranty or liability for your use of this information. Introducing [facility]! Rajat Shaikh introduces [employer] patient portal. Now you can access parts of your medical record, email your doctor's office, and request medication refills online. 1. In your internet browser, go to [email or web address] 2. Click on the First Time User? Click Here link in the Sign In box. You will see the New Member Sign Up page. 3. Enter your [employer] Access Code exactly as it appears below. You will not need to use this code after youve completed the sign-up process.  If you do not sign up before the expiration date, you must request a new code. · [employer] Access Code: [email or web address] Expires: 5/11/2018  4:02 PM 
 
4. Enter the last four digits of your Social Security Number (xxxx) and Date of Birth (mm/dd/yyyy) as indicated and click Submit. You will be taken to the next sign-up page. 5. Create a [employer] ID. This will be your [employer] login ID and cannot be changed, so think of one that is secure and easy to remember. 6. Create a [employer] password. You can change your password at any time. 7. Enter your Password Reset Question and Answer. This can be used at a later time if you forget your password. 8. Enter your e-mail address. You will receive e-mail notification when new information is available in [address]. 9. Click Sign Up. You can now view and download portions of your medical record. 10. Click the Download Summary menu link to download a portable copy of your medical information. If you have questions, please visit the Frequently Asked Questions section of the [employer] website. Remember, [employer] is NOT to be used for urgent needs. For medical emergencies, dial 911. Now available from your iPhone and Android! Please provide this summary of care documentation to your next provider. Your primary care clinician is listed as Rudy Yusuf. If you have any questions after today's visit, please call [phone].

## 2018-03-28 ENCOUNTER — TELEPHONE (OUTPATIENT)
Dept: PEDIATRICS CLINIC | Age: 23
End: 2018-03-28

## 2018-09-17 NOTE — PROGRESS NOTES
Chief Complaint Patient presents with  New Patient  
  [facility] HPI: 
Rickey Pena is a 26 y.o. AA male with Mental retardation, Cri-du-chat Syndrome brought in by sister, Klever Love who is the principle caregiver to [facility]. Previous pcp:  Nato Dangelo. Concern: Aggressive behavior, insomnia. Review of Systems Unable to obtain due to mental status Past Medical History:  
Diagnosis Date  Constipation 7/28/2015  downs syndrome  HX OTHER MEDICAL   
 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  
Procedure Laterality Date  HX OTHER SURGICAL    
 sister states that he had a cecostomy years ago and it was reversed Social History Social History  Marital status: SINGLE Spouse name: N/A  
 Number of children: N/A  
 Years of education: N/A Social History Main Topics  Smoking status: Never Smoker  Smokeless tobacco: Never Used  Alcohol use No  
 Drug use: No  
 Sexual activity: No  
 
Other Topics Concern  None Social History Narrative ** Merged History Encounter ** Family History Problem Relation Age of Onset  Hypertension Mother  Asthma Maternal Grandmother Current Outpatient Prescriptions Medication Sig Dispense Refill  traZODone (DESYREL) 50 mg tablet Take 1 Tab by mouth nightly. 30 Tab 1  
 triamcinolone acetonide (KENALOG) 0.1 % ointment Apply  to affected area two (2) times a day. use thin layer, to dry, red, itchy areas of skin 30 g 2  
 guaiFENesin (ROBITUSSIN) 100 mg/5 mL liquid Take 10 mL by mouth three (3) times daily as needed for Cough. 120 mL 2 Allergies Allergen Reactions  Milk Other (comments) Mother states that he has a reaction to dairy, diarrhea, gas, and vomiting.  Pork Derived (Porcine) Other (comments) Mother states that he has a reaction to pork including diarrhea, gas, and vomiting.  Tomato Other (comments) Intolerance, stomach upset Objective: 
Visit Vitals  /84  Pulse 61  Temp 95 °F (35 °C) (Oral)  Resp 20  
 Ht 5' (1.524 m)  Wt 108 lb (49 kg)  SpO2 98%  BMI 21.09 kg/m2 Physical Exam:  
General appearance - alert, confused in no distress, does not follow direction Neck - supple, no significant adenopathy Chest - clear to auscultation, no wheezes, rales or rhonchi Heart - normal rate, regular rhythm, normal blood pressure Abdomen - soft, nontender, nondistended, no organomegaly Ext-peripheral pulses normal, no pedal edema Neuro -alert, oriented, normal speech, no focal findings Back-full range of motion, no tenderness, palpable spasm or pain on motion Results for orders placed or performed during the hospital encounter of 02/10/18 STREP AG SCREEN, GROUP A Result Value Ref Range Group A Strep Ag ID NEGATIVE  NEG    
CULTURE, THROAT Result Value Ref Range Special Requests: NO SPECIAL REQUESTS Culture result: NORMAL RESPIRATORY CHRISTIANE/NO BETA STREP ISOLATED Assessment/Plan: 
Diagnoses and all orders for this visit: 
 
MR (mental retardation) -     . [facility] Neurology ref [id number] Cri-du-chat syndrome -     METABOLIC PANEL, COMPREHENSIVE 
-     . [facility] Neurology ref [id number] Vitamin D deficiency 
-     VITAMIN D, 25 HYDROXY Primary insomnia 
-     traZODone (DESYREL) 50 mg tablet; Take 1 Tab by mouth nightly., Normal, Disp-30 Tab, R-1 Screening for thyroid disorder 
-     TSH 3RD GENERATION Normocytic hypochromic anemia 
-     CBC WITH AUTOMATED DIFF Dyslipidemia (high LDL; low HDL) -     LIPID PANEL Eczema, unspecified type 
-     triamcinolone acetonide (KENALOG) 0.1 % ointment; Apply  to affected area two (2) times a day. use thin layer, to dry, red, itchy areas of skin, Normal, Disp-30 g, R-2 Patient Instructions Atopic Dermatitis: Care Instructions Your Care Instructions Atopic dermatitis (also called eczema) is a skin problem that causes intense itching and a red, raised rash. In severe cases, the rash develops clear fluid-filled blisters. The rash is not contagious. People with this condition seem to have very sensitive immune systems that are likely to react to things that cause allergies. The immune system is the body's way of fighting infection. There is no cure for atopic dermatitis, but you may be able to control it with care at home. Follow-up care is a key part of your treatment and safety. Be sure to make and go to all appointments, and call your doctor if you are having problems. It's also a good idea to know your test results and keep a list of the medicines you take. How can you care for yourself at home? · Use moisturizer at least twice a day. · If your doctor prescribes a cream, use it as directed. If your doctor prescribes other medicine, take it exactly as directed. · Wash the affected area with water only. Soap can make dryness and itching worse. Pat dry. · Apply a moisturizer after bathing. Use a cream such as Lubriderm, Moisturel, or Cetaphil that does not irritate the skin or cause a rash. Apply the cream while your skin is still damp after lightly drying with a towel. · Use cold, wet cloths to reduce itching. · Keep cool, and stay out of the sun. · If itching affects your normal activities, an over-the-counter antihistamine, such as diphenhydramine (Benadryl) or loratadine (Claritin) may help. Read and follow all instructions on the label. When should you call for help? Call your doctor now or seek immediate medical care if: 
  · Your rash gets worse and you have a fever.  
  · You have new blisters or bruises, or the rash spreads and looks like a sunburn.  
  · You have signs of infection, such as: 
¨ Increased pain, swelling, warmth, or redness. ¨ Red streaks leading from the rash. ¨ Pus draining from the rash. ¨ A fever.   · You have crusting or oozing sores.  
  · You have joint aches or body aches along with your rash.  
 Watch closely for changes in your health, and be sure to contact your doctor if: 
  · Your rash does not clear up after 2 to 3 weeks of home treatment.  
  · Itching interferes with your sleep or daily activities. Where can you learn more? Go to http://kenia-halima.info/. Enter N571 in the search box to learn more about \"Atopic Dermatitis: Care Instructions. \" Current as of: October 5, 2017 Content Version: 11.7 © 3907-3156 [employer]Team. Care instructions adapted under license by [employer] (which disclaims liability or warranty for this information). If you have questions about a medical condition or this instruction, always ask your healthcare professional. [address] any warranty or liability for your use of this information. Follow-up Disposition: 
Return 3-4 weeks, for f/u results.

## 2018-09-17 NOTE — LETTER
10/24/2018 12:13 PM 
 
Mr. Brandon Frazier [address] [address] Dear Aline Kent: 
 
Please find your most recent results below. Vit d is low, Resulted Orders VITAMIN D, 25 HYDROXY Result Value Ref Range VITAMIN D, 25-HYDROXY 12.2 (L) 30.0 - 100.0 ng/mL Comment:  
   Vitamin D deficiency has been defined by the Replaced by [facility] practice guideline as a 
level of serum 25-OH vitamin D less than 20 ng/mL (1,2). The Endocrine Society went on to further define vitamin D 
insufficiency as a level between 21 and 29 ng/mL (2). 1. IOM ([place] of Medicine). 2010. Dietary reference 
   intakes for calcium and D. [address] [facility]: The 
   [employer]. 2. Geena MF, Octaviano NGO, Patti BUNCH, et al. 
   Evaluation, treatment, and prevention of vitamin D 
   deficiency: an Endocrine Society clinical practice 
   guideline. JCEM. 2011 Jul; 96(7):1911-30. Narrative Performed at:  [address]  [id number] : Liseth Scott MD, Phone:  [phone] METABOLIC PANEL, COMPREHENSIVE Result Value Ref Range Glucose 105 (H) 65 - 99 mg/dL BUN 14 6 - 20 mg/dL Creatinine 0.86 0.76 - 1.27 mg/dL GFR est non- >59 mL/min/1.73 GFR est  >59 mL/min/1.73  
 BUN/Creatinine ratio 16 9 - 20 Sodium 140 134 - 144 mmol/L Potassium 3.7 3.5 - 5.2 mmol/L Chloride 100 96 - 106 mmol/L  
 CO2 22 20 - 29 mmol/L Calcium 9.4 8.7 - 10.2 mg/dL Protein, total 7.9 6.0 - 8.5 g/dL Albumin 4.6 3.5 - 5.5 g/dL GLOBULIN, TOTAL 3.3 1.5 - 4.5 g/dL A-G Ratio 1.4 1.2 - 2.2 Bilirubin, total 0.3 0.0 - 1.2 mg/dL Alk. phosphatase 82 39 - 117 IU/L  
 AST (SGOT) 17 0 - 40 IU/L  
 ALT (SGPT) 8 0 - 44 IU/L Narrative Performed at:  [address]  [id number] : Alex Weiner MD, Phone:  [phone] CBC WITH AUTOMATED DIFF Result Value Ref Range WBC 6.2 3.4 - 10.8 x10E3/uL  
 RBC 4.83 4.14 - 5.80 x10E6/uL HGB 14.3 13.0 - 17.7 g/dL HCT 42.7 37.5 - 51.0 % MCV 88 79 - 97 fL  
 MCH 29.6 26.6 - 33.0 pg  
 MCHC 33.5 31.5 - 35.7 g/dL  
 RDW 13.1 12.3 - 15.4 % PLATELET 866 908 - 071 x10E3/uL NEUTROPHILS 44 Not Estab. % Lymphocytes 40 Not Estab. % MONOCYTES 13 Not Estab. % EOSINOPHILS 2 Not Estab. % BASOPHILS 1 Not Estab. %  
 ABS. NEUTROPHILS 2.7 1.4 - 7.0 x10E3/uL Abs Lymphocytes 2.4 0.7 - 3.1 x10E3/uL  
 ABS. MONOCYTES 0.8 0.1 - 0.9 x10E3/uL  
 ABS. EOSINOPHILS 0.2 0.0 - 0.4 x10E3/uL  
 ABS. BASOPHILS 0.0 0.0 - 0.2 x10E3/uL IMMATURE GRANULOCYTES 0 Not Estab. %  
 ABS. IMM. GRANS. 0.0 0.0 - 0.1 x10E3/uL Narrative Performed at:  [address]  [id number] : Alex Weiner MD, Phone:  [phone] LIPID PANEL Result Value Ref Range Cholesterol, total 117 100 - 199 mg/dL Triglyceride 57 0 - 149 mg/dL HDL Cholesterol 41 >39 mg/dL VLDL, calculated 11 5 - 40 mg/dL LDL, calculated 65 0 - 99 mg/dL Narrative Performed at:  [address]  [id number] : Alex Weiner MD, Phone:  [phone] TSH 3RD GENERATION Result Value Ref Range TSH 4.350 0.450 - 4.500 uIU/mL Narrative Performed at:  [address]  [id number] : Alex Weiner MD, Phone:  [phone] RECOMMENDATIONS: 
Make sure you are taking 500mg of Calcium with Vitamin D twice a day. Continue with current  diet and medications. Please call me if you have any questions: [phone] Sincerely, Janiya Amezquita MD

## 2018-09-17 NOTE — PATIENT INSTRUCTIONS

## 2018-09-17 NOTE — MR AVS SNAPSHOT
[address] [facility] [address] [facility] 
[phone] Patient: Leydi Dave MRN: [id number] :1995 Visit Information Date & Time Provider Department Dept. Phone Encounter #  
 2018  8:30 AM Maddie Christina MD [facility] at [address] [id number] Follow-up Instructions Return 3-4 weeks, for f/u results. Upcoming Health Maintenance Date Due  
 HPV Age 9Y-34Y (3 of 1 - Male 3 Dose Series) 2008 DTaP/Tdap/Td series (5 - Td) 2017 Influenza Age 5 to Adult 2018 Allergies as of 2018  Review Complete On: 2018 By: Maddie Christina MD  
  
 Severity Noted Reaction Type Reactions Milk  2015    Other (comments) Mother states that he has a reaction to dairy, diarrhea, gas, and vomiting. Pork Derived (Porcine)  2015    Other (comments) Mother states that he has a reaction to pork including diarrhea, gas, and vomiting. Tomato  2017    Other (comments) Intolerance, stomach upset Current Immunizations  Reviewed on 2015 Name Date DTaP 7/15/1998, 1/15/1998, 1997 Hep A Vaccine (Adult) 10/6/2017 Hep B Vaccine 3/11/1998, 1997, 1997 Hib 11/15/1998, 3/19/1998, 1/15/1998, 1997 MMR 2003, 1998 Meningococcal (MCV4O) Vaccine 10/6/2017 Meningococcal (MCV4P) Vaccine 2015 Meningococcal B (OMV) Vaccine 10/6/2017 Poliovirus vaccine 2009, 3/19/1998, 1998, 1997 Tdap 2007 Varicella Virus Vaccine 10/6/2017, 11/15/1998 Not reviewed this visit You Were Diagnosed With   
  
 Codes Comments Global developmental delay    -  Primary ICD-10-CM: F88 
ICD-9-CM: 315.8 Cri-du-chat syndrome     ICD-10-CM: Q93.4 ICD-9-CM: 758.31 Vitamin D deficiency     ICD-10-CM: E55.9 ICD-9-CM: 268.9  Primary insomnia     ICD-10-CM: F51.01 
ICD-9-CM: 307.42   
 Screening for thyroid disorder     ICD-10-CM: Z13.29 ICD-9-CM: V77.0 Normocytic hypochromic anemia     ICD-10-CM: D50.9 ICD-9-CM: 280.9 Dyslipidemia (high LDL; low HDL)     ICD-10-CM: E78.5 ICD-9-CM: 272.4 Eczema, unspecified type     ICD-10-CM: L30.9 ICD-9-CM: 692.9 Vitals BP Pulse Temp Resp Height(growth percentile) Weight(growth percentile) 121/84 61 95 °F (35 °C) (Oral) 20 5' (1.524 m) 108 lb (49 kg) SpO2 BMI Smoking Status 98% 21.09 kg/m2 Never Smoker Vitals History BMI and BSA Data Body Mass Index Body Surface Area 21.09 kg/m 2 1.44 m 2 Preferred Pharmacy Pharmacy Name Phone [id number] [facility] [phone] Your Updated Medication List  
  
   
This list is accurate as of 9/17/18  9:56 AM.  Always use your most recent med list.  
  
  
  
  
 guaiFENesin 100 mg/5 mL liquid Commonly known as:  ROBITUSSIN Take 10 mL by mouth three (3) times daily as needed for Cough. traZODone 50 mg tablet Commonly known as:  [place] Bump Take 1 Tab by mouth nightly. triamcinolone acetonide 0.1 % ointment Commonly known as:  KENALOG Apply  to affected area two (2) times a day. use thin layer, to dry, red, itchy areas of skin Prescriptions Sent to Pharmacy Refills  
 traZODone (DESYREL) 50 mg tablet 1 Sig: Take 1 Tab by mouth nightly. Class: Normal  
 Pharmacy: [address] Ph #: [phone] Route: Oral  
 triamcinolone acetonide (KENALOG) 0.1 % ointment 2 Sig: Apply  to affected area two (2) times a day. use thin layer, to dry, red, itchy areas of skin Class: Normal  
 Pharmacy: [address] Ph #: [phone] Route: Topical  
  
We Performed the Following CBC WITH AUTOMATED DIFF [07327 CPT(R)] LIPID PANEL [00976 CPT(R)] METABOLIC PANEL, COMPREHENSIVE [37393 CPT(R)] TSH 3RD GENERATION [91232 CPT(R)] VITAMIN D, 25 HYDROXY [id number] CPT(R)] Follow-up Instructions Return 3-4 weeks, for f/u results. Patient Instructions Atopic Dermatitis: Care Instructions Your Care Instructions Atopic dermatitis (also called eczema) is a skin problem that causes intense itching and a red, raised rash. In severe cases, the rash develops clear fluid-filled blisters. The rash is not contagious. People with this condition seem to have very sensitive immune systems that are likely to react to things that cause allergies. The immune system is the body's way of fighting infection. There is no cure for atopic dermatitis, but you may be able to control it with care at home. Follow-up care is a key part of your treatment and safety. Be sure to make and go to all appointments, and call your doctor if you are having problems. It's also a good idea to know your test results and keep a list of the medicines you take. How can you care for yourself at home? · Use moisturizer at least twice a day. · If your doctor prescribes a cream, use it as directed. If your doctor prescribes other medicine, take it exactly as directed. · Wash the affected area with water only. Soap can make dryness and itching worse. Pat dry. · Apply a moisturizer after bathing. Use a cream such as Lubriderm, Moisturel, or Cetaphil that does not irritate the skin or cause a rash. Apply the cream while your skin is still damp after lightly drying with a towel. · Use cold, wet cloths to reduce itching. · Keep cool, and stay out of the sun. · If itching affects your normal activities, an over-the-counter antihistamine, such as diphenhydramine (Benadryl) or loratadine (Claritin) may help. Read and follow all instructions on the label. When should you call for help? Call your doctor now or seek immediate medical care if: 
  · Your rash gets worse and you have a fever.  
  · You have new blisters or bruises, or the rash spreads and looks like a sunburn.  
  · You have signs of infection, such as: 
¨ Increased pain, swelling, warmth, or redness. ¨ Red streaks leading from the rash. ¨ Pus draining from the rash. ¨ A fever.  
  · You have crusting or oozing sores.  
  · You have joint aches or body aches along with your rash.  
 Watch closely for changes in your health, and be sure to contact your doctor if: 
  · Your rash does not clear up after 2 to 3 weeks of home treatment.  
  · Itching interferes with your sleep or daily activities. Where can you learn more? Go to http://kenia-halima.info/. Enter M817 in the search box to learn more about \"Atopic Dermatitis: Care Instructions. \" Current as of: October 5, 2017 Content Version: 11.7 © 5468-7289 Jenn Rykert. Care instructions adapted under license by [employer] (which disclaims liability or warranty for this information). If you have questions about a medical condition or this instruction, always ask your healthcare professional. [place] [address] any warranty or liability for your use of this information. Introducing [facility] & HE[facility]! [employer] introduces AudioName patient portal. Now you can access parts of your medical record, email your doctor's office, and request medication refills online. 1. In your internet browser, go to [email or web address] ([email or web address])t 2. Click on the First Time User? Click Here link in the Sign In box. You will see the New Member Sign Up page. 3. Enter your AudioName Access Code exactly as it appears below. You will not need to use this code after youve completed the sign-up process. If you do not sign up before the expiration date, you must request a new code. · AudioName Access Code: [email or web address] Expires: 12/16/2018  9:56 AM 
 
4. Enter the last four digits of your Social Security Number (xxxx) and Date of Birth (mm/dd/yyyy) as indicated and click Submit. You will be taken to the next sign-up page. 5. Create a SmartCrowdz ID. This will be your SmartCrowdz login ID and cannot be changed, so think of one that is secure and easy to remember. 6. Create a SmartCrowdz password. You can change your password at any time. 7. Enter your Password Reset Question and Answer. This can be used at a later time if you forget your password. 8. Enter your e-mail address. You will receive e-mail notification when new information is available in [address] 9. Click Sign Up. You can now view and download portions of your medical record. 10. Click the Download Summary menu link to download a portable copy of your medical information. If you have questions, please visit the Frequently Asked Questions section of the SmartCrowdz website. Remember, SmartCrowdz is NOT to be used for urgent needs. For medical emergencies, dial 911. Now available from your iPhone and Android! Please provide this summary of care documentation to your next provider. Your primary care clinician is listed as Moody Quiroga. If you have any questions after today's visit, please call [phone].

## 2018-10-17 NOTE — LETTER
10/17/2018 10:59 AM 
 
Mr. Josh Valenz[address]a 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

## 2018-10-17 NOTE — PATIENT INSTRUCTIONS
Office Policies 
o Phone calls/patient messages: Please allow up to 24 hours for someone in the office to contact you about your call or message. Be mindful your provider may be out of the office or your message may require further review. We encourage you to use [employer] for your messages as this is a faster, more efficient way to communicate with our officeo Medication Refills: 
Prescription medications require up to 48 business hours to process. We encourage you to use [employer] for your refills. For controlled medications: Please allow up to 72 business hours to process. Certain medications may require you to  a written prescription at our office. NO narcotic/controlled medications will be prescribed after 4pm Monday through Friday or on weekendso Form/Paperwork Completion: 
Please note there is a $25 fee for all paperwork completed by our providers. We ask that you allow 7-14 business days. Pre-payment is due prior to picking up/faxing the completed form. You may also download your forms to [employer] to have your doctor print off. 
o Test Results: In order for a patient to obtain test results, an appointment must be made with the physician to review the results. Test results cannot be discussed over the phone.

## 2018-10-24 NOTE — LETTER
[facility] [address] [facility] [address] [place] 
[phone] Work/School Note Date: 10/24/2018 To Whom It May concern: Mr. Janet Berry is an [facility] American male who has chronic congenital medical problems. Patient is easily agitated and as such can develop aggressive behavior. Based on these medical diagnosis I strongly advised that patient should not share room with others so as to limit agitation. Sincerely, Mitzi Herbert MD

## 2018-10-24 NOTE — PROGRESS NOTES
Chief Complaint   Patient presents with    Consent     letter     HPI:  Mamie Dillard is a 26 y.o. AA male with multiple chronic congenital medical diagnoses presents accompanied by sister who is the primary caregiver to follow up blood work. Except for low serum vit d level, all other lab results are within normal limits. Also, caregiver is requesting a letter affirming that patient needs his own bedroom and I agree. 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\"     Past Surgical History:   Procedure Laterality Date    HX OTHER SURGICAL      sister states that he had a cecostomy years ago and it was reversed     Social History     Socioeconomic History    Marital status: SINGLE     Spouse name: Not on file    Number of children: Not on file    Years of education: Not on file    Highest education level: Not on file   Social Needs    Financial resource strain: Not on file    Food insecurity - worry: Not on file    Food insecurity - inability: Not on file   [employer] needs - medical: Not on file   [employer] needs - non-medical: Not on file   Occupational History    Not on file   Tobacco Use    Smoking status: Never Smoker    Smokeless tobacco: Never Used   Substance and Sexual Activity    Alcohol use: No    Drug use: No    Sexual activity: No   Other Topics Concern    Not on file   Social History Narrative    ** Merged History Encounter **          Family History   Problem Relation Age of Onset    Hypertension Mother     Asthma Maternal Grandmother      Current Outpatient Medications   Medication Sig Dispense Refill    LORazepam (ATIVAN) 0.5 mg tablet 1/2 or 1 po q 4 hours prn agitation, may refill monthly 40 Tab 5    traZODone (DESYREL) 50 mg tablet Take 1 Tab by mouth nightly.  30 Tab 1    triamcinolone acetonide (KENALOG) 0.1 % ointment Apply  to affected area two (2) times a day. use thin layer, to dry, red, itchy areas of skin 30 g 2    cholecalciferol, VITAMIN D3, (VITAMIN D3) 5,000 unit tab tablet Take 1 Tab by mouth daily. 90 Tab 1     Allergies   Allergen Reactions    Milk Other (comments)     Mother states that he has a reaction to dairy, diarrhea, gas, and vomiting.  Pork Derived (Porcine) Other (comments)     Mother states that he has a reaction to pork including diarrhea, gas, and vomiting.  Tomato Other (comments)     Intolerance, stomach upset       Objective:  Visit Vitals  /80   Pulse 70   Temp 96 °F (35.6 °C)   Resp 20   Ht 5' (1.524 m)   Wt 106 lb (48.1 kg)   SpO2 98%   BMI 20.70 kg/m²     Physical Exam:   General appearance - alert, easily agitated, aggressive   Neck - supple, no significant adenopathy   Chest - clear to auscultation, no wheezes, rales or rhonchi  Heart - normal rate, regular rhythm, normal blood pressure  Abdomen - soft, nontender, nondistended, no organomegaly  Ext-peripheral pulses normal, no pedal edema, no clubbing or cyanosis  Skin-Warm and dry. no hyperpigmentation, vitiligo, or suspicious lesions  Neuro -alert, oriented, abnormal speech    Results for orders placed or performed in visit on 09/17/18   VITAMIN D, 25 HYDROXY   Result Value Ref Range    VITAMIN D, 25-HYDROXY 12.2 (L) 30.0 - 712.3 ng/mL   METABOLIC PANEL, COMPREHENSIVE   Result Value Ref Range    Glucose 105 (H) 65 - 99 mg/dL    BUN 14 6 - 20 mg/dL    Creatinine 0.86 0.76 - 1.27 mg/dL    GFR est non- >59 mL/min/1.73    GFR est  >59 mL/min/1.73    BUN/Creatinine ratio 16 9 - 20    Sodium 140 134 - 144 mmol/L    Potassium 3.7 3.5 - 5.2 mmol/L    Chloride 100 96 - 106 mmol/L    CO2 22 20 - 29 mmol/L    Calcium 9.4 8.7 - 10.2 mg/dL    Protein, total 7.9 6.0 - 8.5 g/dL    Albumin 4.6 3.5 - 5.5 g/dL    GLOBULIN, TOTAL 3.3 1.5 - 4.5 g/dL    A-G Ratio 1.4 1.2 - 2.2    Bilirubin, total 0.3 0.0 - 1.2 mg/dL    Alk.  phosphatase 82 39 - 117 IU/L    AST (SGOT) 17 0 - 40 IU/L    ALT (SGPT) 8 0 - 44 IU/L   CBC WITH AUTOMATED DIFF   Result Value Ref Range    WBC 6.2 3.4 - 10.8 x10E3/uL    RBC 4.83 4.14 - 5.80 x10E6/uL    HGB 14.3 13.0 - 17.7 g/dL    HCT 42.7 37.5 - 51.0 %    MCV 88 79 - 97 fL    MCH 29.6 26.6 - 33.0 pg    MCHC 33.5 31.5 - 35.7 g/dL    RDW 13.1 12.3 - 15.4 %    PLATELET 172 433 - 112 x10E3/uL    NEUTROPHILS 44 Not Estab. %    Lymphocytes 40 Not Estab. %    MONOCYTES 13 Not Estab. %    EOSINOPHILS 2 Not Estab. %    BASOPHILS 1 Not Estab. %    ABS. NEUTROPHILS 2.7 1.4 - 7.0 x10E3/uL    Abs Lymphocytes 2.4 0.7 - 3.1 x10E3/uL    ABS. MONOCYTES 0.8 0.1 - 0.9 x10E3/uL    ABS. EOSINOPHILS 0.2 0.0 - 0.4 x10E3/uL    ABS. BASOPHILS 0.0 0.0 - 0.2 x10E3/uL    IMMATURE GRANULOCYTES 0 Not Estab. %    ABS. IMM. GRANS. 0.0 0.0 - 0.1 x10E3/uL   LIPID PANEL   Result Value Ref Range    Cholesterol, total 117 100 - 199 mg/dL    Triglyceride 57 0 - 149 mg/dL    HDL Cholesterol 41 >39 mg/dL    VLDL, calculated 11 5 - 40 mg/dL    LDL, calculated 65 0 - 99 mg/dL   TSH 3RD GENERATION   Result Value Ref Range    TSH 4.350 0.450 - 4.500 uIU/mL     Assessment/Plan:  Diagnoses and all orders for this visit:    Vitamin D deficiency  -     cholecalciferol, VITAMIN D3, (VITAMIN D3) 5,000 unit tab tablet; Take 1 Tab by mouth daily. , Normal, Disp-90 Tab, R-1    Encounter for immunization  -     INFLUENZA VIRUS VAC QUAD,SPLIT,PRESV FREE SYRINGE IM    Cri-du-chat syndrome    Aggressive behavior      Patient Instructions        Learning About Vitamin D  Why is it important to get enough vitamin D? Your body needs vitamin D to absorb calcium. Calcium keeps your bones and muscles, including your heart, healthy and strong. If your muscles don't get enough calcium, they can cramp, hurt, or feel weak. You may have long-term (chronic) muscle aches and pains.   If you don't get enough vitamin D throughout life, you have an increased chance of having thin and brittle bones (osteoporosis) in your later years. Children who don't get enough vitamin D may not grow as much as others their age. They also have a chance of getting a rare disease called rickets. It causes weak bones. Vitamin D and calcium are added to many foods. And your body uses sunshine to make its own vitamin D. How much vitamin D do you need? The [place] of Medicine recommends that people ages 3 through 79 get 600 IU (international units) every day. Adults 71 and older need 800 IU every day. Blood tests for vitamin D can check your vitamin D level. But there is no standard normal range used by all laboratories. You're likely getting enough vitamin D if your levels are in the range of 20 to 50 ng/mL. How can you get more vitamin D? Foods that contain vitamin D include:  · [place], tuna, and mackerel. These are some of the best foods to eat when you need to get more vitamin D.  · Cheese, egg yolks, and beef liver. These foods have vitamin D in small amounts. · Milk, soy drinks, orange juice, yogurt, margarine, and some kinds of cereal have vitamin D added to them. Some people don't make vitamin D as well as others. They may have to take extra care in getting enough vitamin D. Things that reduce how much vitamin D your body makes include:  · Dark skin, such as many  Americans have. · Age, especially if you are older than 72. · Digestive problems, such as Crohn's or celiac disease. · Liver and kidney disease. Some people who do not get enough vitamin D may need supplements. Are there any risks from taking vitamin D?  · Too much vitamin D:  ? Can damage your kidneys. ? Can cause nausea and vomiting, constipation, and weakness. ? Raises the amount of calcium in your blood. If this happens, you can get confused or have an irregular heart rhythm. · Vitamin D may interact with other medicines. Tell your doctor about all of the medicines you take, including over-the-counter drugs, herbs, and pills.  Tell your doctor about all of your current medical problems. Where can you learn more? Go to http://kenia-halima.info/. Enter [id number] in the search box to learn more about \"Learning About Vitamin D.\"  Current as of: March 29, 2018  Content Version: 11.8  © 3591-2361 [employer]. Care instructions adapted under license by [employer] (which disclaims liability or warranty for this information). If you have questions about a medical condition or this instruction, always ask your healthcare professional. [address] any warranty or liability for your use of this information. Follow-up Disposition:  Return in about 4 months (around 2/24/2019), or if symptoms worsen or fail to improve, for routine.

## 2018-10-26 NOTE — PATIENT INSTRUCTIONS
Learning About Vitamin D  Why is it important to get enough vitamin D? Your body needs vitamin D to absorb calcium. Calcium keeps your bones and muscles, including your heart, healthy and strong. If your muscles don't get enough calcium, they can cramp, hurt, or feel weak. You may have long-term (chronic) muscle aches and pains. If you don't get enough vitamin D throughout life, you have an increased chance of having thin and brittle bones (osteoporosis) in your later years. Children who don't get enough vitamin D may not grow as much as others their age. They also have a chance of getting a rare disease called rickets. It causes weak bones. Vitamin D and calcium are added to many foods. And your body uses sunshine to make its own vitamin D. How much vitamin D do you need? The [place] of Medicine recommends that people ages 3 through 79 get 600 IU (international units) every day. Adults 71 and older need 800 IU every day. Blood tests for vitamin D can check your vitamin D level. But there is no standard normal range used by all laboratories. You're likely getting enough vitamin D if your levels are in the range of 20 to 50 ng/mL. How can you get more vitamin D? Foods that contain vitamin D include:  · [place], tuna, and mackerel. These are some of the best foods to eat when you need to get more vitamin D.  · Cheese, egg yolks, and beef liver. These foods have vitamin D in small amounts. · Milk, soy drinks, orange juice, yogurt, margarine, and some kinds of cereal have vitamin D added to them. Some people don't make vitamin D as well as others. They may have to take extra care in getting enough vitamin D. Things that reduce how much vitamin D your body makes include:  · Dark skin, such as many  Americans have. · Age, especially if you are older than 72. · Digestive problems, such as Crohn's or celiac disease. · Liver and kidney disease.   Some people who do not get enough vitamin D may need supplements. Are there any risks from taking vitamin D?  · Too much vitamin D:  ? Can damage your kidneys. ? Can cause nausea and vomiting, constipation, and weakness. ? Raises the amount of calcium in your blood. If this happens, you can get confused or have an irregular heart rhythm. · Vitamin D may interact with other medicines. Tell your doctor about all of the medicines you take, including over-the-counter drugs, herbs, and pills. Tell your doctor about all of your current medical problems. Where can you learn more? Go to http://kenia-halima.info/. Enter [id number] in the search box to learn more about \"Learning About Vitamin D.\"  Current as of: March 29, 2018  Content Version: 11.8  © 1396-3332 [employer]. Care instructions adapted under license by [employer] (which disclaims liability or warranty for this information). If you have questions about a medical condition or this instruction, always ask your healthcare professional. [place] [address] any warranty or liability for your use of this information.

## 2018-11-14 PROBLEM — G93.49 CHRONIC STATIC ENCEPHALOPATHY: Status: ACTIVE | Noted: 2018-01-01

## 2018-11-14 NOTE — PROGRESS NOTES
HISTORY OF PRESENT ILLNESS Natividad Anderson is a 26 y.o. male. 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 He is accompanied by his mother. He has Cri Du Chat syndrome and is here for a general review of his neurological status. He does not have epilepsy, he lives with his parents and is capable of assisting himself doing a number of things. He does not have any other significant medical history. He does not cause problems at home but he does have to be supervised. He will occasionally become somewhat anxious and do some repetitive behaviors, otherwise he is well. He is accompanied by his parents. He does take trazodone to sleep. Review of Systems Constitutional:  
     The patient denies any pain or discomfort, complete review of systems is  negative. Current Outpatient Medications on File Prior to Visit Medication Sig Dispense Refill  traZODone (DESYREL) 50 mg tablet Take 1 Tab by mouth nightly. 30 Tab 1  
 triamcinolone acetonide (KENALOG) 0.1 % ointment Apply  to affected area two (2) times a day. use thin layer, to dry, red, itchy areas of skin 30 g 2 No current facility-administered medications on file prior to visit. Past Medical History:  
Diagnosis Date  Constipation 7/28/2015  downs syndrome  HX OTHER MEDICAL   
 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  
 Drug use: No  
 
/70   Pulse 73   Ht 5' (1.524 m)   Wt 48.1 kg (106 lb)   SpO2 96%   BMI 20.70 kg/m² Physical Exam 
Constitutional: Oriented to person, place, and time, appears well-developed and well-nourished. No distress. HENT:  
Head: Normocephalic and atraumatic. Mouth/Throat: Oropharynx is clear and moist. No oropharyngeal exudate. Eyes: Conjunctivae and EOM are normal. Pupils are equal, round, and reactive to light. No scleral icterus. Neck: Normal range of motion. Neck supple. No thyromegaly present. Cardiovascular: Normal rate, regular rhythm and normal heart sounds. Musculoskeletal: Normal range of motion, exhibits no edema, tenderness or deformity. Lymphadenopathy: no cervical adenopathy. Neurological: Alert and oriented to person, place, and time. Normal strength and normal reflexes. Displays no atrophy and no tremor. No cranial nerve deficit or sensory deficit. Exhibits normal muscle tone. Displays a negative Romberg sign, no seizure activity. Coordination normal, gait normal.  
No Babinski's sign on the right side. No Babinski's sign on the left side. Speech,  he is completely understandable. Is normal, there is a decrease in general language, Visual fields are full to confrontation, funduscopic exam reveals flat discs, the retina and vasculature are normal  
Skin: Skin is warm and dry. No rash noted, not diaphoretic. No erythema. Psychiatric: Normal mood and affect,  behavior is normal. Judgment and thought content normal.  
Vitals reviewed. ASSESSMENT and PLAN 
CONGENITAL STATIC ENCEPHALOPATHY This patient has been diagnosed with cri du chat syndrome, I do not know for sure that is what he has but he clearly has congenital static encephalopathy, he looks more like Down syndrome to me however I do not know his chromosomal set up. .  I see no reason for action at this time. He does not have epilepsy and is not overly aggressive at home, I will give his caregivers a prescription for some lorazepam to use when he becomes agitated, they do not expect to have to use this very often. I will plan to see him back in 12 months. The parents will call me if they have any difficulties with him in the interim. This note will not be viewable in [address]

## 2018-12-18 NOTE — TELEPHONE ENCOUNTER
Mom wants to know if pt can be tested for Hepatis and HIV when he comes for lead testing tomorrow. Also, mom wants to know if pt is up to date on all of his hepatis shots, and all other shots including HPV. If there is anything he needs. If she can get a call back today. Private Vehicle

## 2019-01-01 ENCOUNTER — OFFICE VISIT (OUTPATIENT)
Dept: NEUROLOGY | Age: 24
End: 2019-01-01

## 2019-01-01 ENCOUNTER — HOSPITAL ENCOUNTER (EMERGENCY)
Age: 24
End: 2019-04-09
Attending: EMERGENCY MEDICINE
Payer: MEDICAID

## 2019-01-01 VITALS — WEIGHT: 104 LBS | HEIGHT: 60 IN | BODY MASS INDEX: 20.42 KG/M2 | HEART RATE: 72 BPM

## 2019-01-01 DIAGNOSIS — R45.1 AGITATION: Primary | ICD-10-CM

## 2019-01-01 DIAGNOSIS — I46.9 CARDIAC ARREST (HCC): Primary | ICD-10-CM

## 2019-01-01 DIAGNOSIS — R09.2 RESPIRATORY ARREST (HCC): ICD-10-CM

## 2019-01-01 PROCEDURE — 74011000250 HC RX REV CODE- 250: Performed by: EMERGENCY MEDICINE

## 2019-01-01 PROCEDURE — 74011250636 HC RX REV CODE- 250/636: Performed by: EMERGENCY MEDICINE

## 2019-01-01 PROCEDURE — 92950 HEART/LUNG RESUSCITATION CPR: CPT

## 2019-01-01 PROCEDURE — 99284 EMERGENCY DEPT VISIT MOD MDM: CPT

## 2019-01-01 RX ORDER — SODIUM BICARBONATE 1 MEQ/ML
SYRINGE (ML) INTRAVENOUS
Status: COMPLETED | OUTPATIENT
Start: 2019-01-01 | End: 2019-01-01

## 2019-01-01 RX ORDER — EPINEPHRINE 0.1 MG/ML
INJECTION INTRACARDIAC; INTRAVENOUS
Status: COMPLETED | OUTPATIENT
Start: 2019-01-01 | End: 2019-01-01

## 2019-01-01 RX ORDER — LORAZEPAM 2 MG/1
TABLET ORAL
Qty: 60 TAB | Refills: 5 | Status: SHIPPED | OUTPATIENT
Start: 2019-01-01

## 2019-01-01 RX ORDER — CALCIUM CHLORIDE INJECTION 100 MG/ML
INJECTION, SOLUTION INTRAVENOUS
Status: COMPLETED | OUTPATIENT
Start: 2019-01-01 | End: 2019-01-01

## 2019-01-01 RX ADMIN — SODIUM BICARBONATE 50 MEQ: 84 INJECTION, SOLUTION INTRAVENOUS at 10:36

## 2019-01-01 RX ADMIN — SODIUM BICARBONATE 50 MEQ: 84 INJECTION, SOLUTION INTRAVENOUS at 10:38

## 2019-01-01 RX ADMIN — EPINEPHRINE 1 MG: 0.1 INJECTION, SOLUTION ENDOTRACHEAL; INTRACARDIAC; INTRAVENOUS at 10:35

## 2019-01-01 RX ADMIN — CALCIUM CHLORIDE 1 G: 100 INJECTION, SOLUTION INTRAVENOUS at 10:36

## 2019-01-01 RX ADMIN — EPINEPHRINE 1 MG: 0.1 INJECTION, SOLUTION ENDOTRACHEAL; INTRACARDIAC; INTRAVENOUS at 10:40

## 2019-01-24 NOTE — PROGRESS NOTES
HISTORY OF PRESENT ILLNESS This patient is a 82-year-old male with congenital static encephalopathy. He is accompanied by his mother. He has Cri Du Chat syndrome and is here for a general review of his neurological status. He does not have epilepsy, he lives with his parents and is capable of assisting himself doing a number of things. He does not have any other significant medical history. He does not cause problems at home but he does have to be supervised. He will occasionally become somewhat anxious and do some repetitive behaviors, otherwise he is well. He is accompanied by his parents. He does take trazodone to sleep. His mother states that his Ativan which is a 0.5 mg tablet is not affecting him anymore while it did at the beginning. She uses it when he gets particularly agitated. He is otherwise doing well without seizures. Follow-up Review of Systems Constitutional:  
     The patient denies any pain or discomfort, complete review of systems is  negative. Current Outpatient Medications on File Prior to Visit Medication Sig Dispense Refill  traZODone (DESYREL) 50 mg tablet Take 1 Tab by mouth nightly. 30 Tab 5  cholecalciferol, VITAMIN D3, (VITAMIN D3) 5,000 unit tab tablet Take 1 Tab by mouth daily. 90 Tab 1  
 LORazepam (ATIVAN) 0.5 mg tablet 1/2 or 1 po q 4 hours prn agitation, may refill monthly 40 Tab 5  
 triamcinolone acetonide (KENALOG) 0.1 % ointment Apply  to affected area two (2) times a day. use thin layer, to dry, red, itchy areas of skin 30 g 2 No current facility-administered medications on file prior to visit. Past Medical History:  
Diagnosis Date  Constipation 7/28/2015  downs syndrome  HX OTHER MEDICAL   
 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  
 Drug use: No  
 
Ht 5' (1.524 m)   BMI 20.70 kg/m² Physical Exam 
Constitutional: Oriented to person, place, and time, appears well-developed and well-nourished. No distress. HENT:  
Head: Normocephalic and atraumatic.   that we go ahead and order this Mouth/Throat: Oropharynx is clear and moist. No oropharyngeal exudate. Eyes: Conjunctivae and EOM are normal. Pupils are equal, round, and reactive to light. No scleral icterus. Neck: Normal range of motion. Neck supple. No thyromegaly present. Cardiovascular: Normal rate, regular rhythm and normal heart sounds. Musculoskeletal: Normal range of motion, exhibits no edema, tenderness or deformity. Lymphadenopathy: no cervical adenopathy. Neurological: Alert and oriented to person, place, and time. Normal strength and normal reflexes. Displays no atrophy and no tremor. No cranial nerve deficit or sensory deficit. Exhibits normal muscle tone. Displays a negative Romberg sign, no seizure activity. Coordination normal, gait normal.  
No Babinski's sign on the right side. No Babinski's sign on the left side. Speech,  he is completely understandable. Is normal, there is a decrease in general language, Visual fields are full to confrontation, funduscopic exam reveals flat discs, the retina and vasculature are normal  
Skin: Skin is warm and dry. No rash noted, not diaphoretic. No erythema. Psychiatric: Normal mood and affect,  behavior is normal. Judgment and thought content normal.  
Vitals reviewed. ASSESSMENT and PLAN 
CONGENITAL STATIC ENCEPHALOPATHY This patient has been diagnosed with cri du chat syndrome, I do not know for sure that is what he has but he clearly has congenital static encephalopathy, he looks more like Down syndrome to me however I do not know his chromosomal set up. .  I see no reason for action at this time. He does not have epilepsy and is not overly aggressive at home, I will give his caregiver a prescription for 2 mg  lorazepam to use when he becomes agitated, she can cut it in half and give him a half if that does not do the job she can move to the whole pill. They do not expect to have to use this very often. I will plan to see him back in 6 months. His mother will call me if they have any difficulties with him in the interim. This note will not be viewable in [address]

## 2019-01-24 NOTE — PATIENT INSTRUCTIONS

## 2019-04-09 NOTE — PROGRESS NOTES
Spiritual Care Assessment/Progress Note [facility] 
 
 
NAME: Rylan Diaz      MRN: [id number] AGE: 26 y.o. SEX: male Protestant Affiliation: Landon Pereira  
Language: [place] 2019     Total Time (in minutes): 165 Spiritual Assessment begun in [facility] EMERGENCY DEPT through conversation with: 
  
    []Patient        [x] Family    [] Friend(s) Reason for Consult: Code Blue/99, Death, ER Spiritual beliefs: (Please include comment if needed) [x] Identifies with a freddie tradition:     
   [x] Supported by a freddie community:        
   [] Claims no spiritual orientation:       
   [] Seeking spiritual identity:            
   [] Adheres to an individual form of spirituality:       
   [] Not able to assess:                   
 
    
Identified resources for coping:  
   [x] Prayer                           
   [] Music                  [] Guided Imagery [x] Family/friends                 [] Pet visits [] Devotional reading                         [] Unknown 
   [] Other:                                         
 
 
Interventions offered during this visit: (See comments for more details) Patient Interventions: Crisis, Affirmation of freddie, Prayer (actual) Family/Friend(s): Affirmation of emotions/emotional suffering, Affirmation of freddie, Coordination with community clergy, /memorial planning, Coping skills reviewed/reinforced, Prayer (assurance of), Prayer (actual), Normalization of emotional/spiritual concerns Plan of Care: 
 
 [x] Support spiritual and/or cultural needs  
 [] Support AMD and/or advance care planning process [x] Support grieving process 
 [] Coordinate Rites and/or Rituals  
 [] Coordination with community clergy [] No spiritual needs identified at this time 
 [] Detailed Plan of Care below (See Comments)  [] Make referral to Music Therapy 
[] Make referral to Pet Therapy    
[] Make referral to Addiction services [] Make referral to [facility] 
[] Make referral to Spiritual Care Partner 
[] No future visits requested       
[] Follow up visits as needed Comments:  responded to ED for a patient arriving as a code.  met patient as they arrived and went to the waiting room to see family had arrived. The patients sister, Alana Messer had arrived and  escorted her to the consult room. Kyle' friend, Qasim Krishnamurthy, arrived shortly and was in the consult room with Alana Messer.  went back to let the medical staff know that the sister had arrived and was in the consult room.  went to consult room and provided pastoral presence and comfort while they waited to hear any news. Jaja Earing went to get some water for the sister and checked back in on the patient and the patient had passed.  accompanied the doctor to the consult room to tell the sister. Sister did not take the news very well. Sister become very emotional and was in shock and grieving. This turned into a ME case due to the patient being young, 26years old. Doctor returned to the consult room to inform sister and see if they wanted to see the body. They said they did at some point.  stayed with Alana Messer while to tried to get in touch with other family members.  and nurse tried to get the sister into the room, but Alana Messer could not go into the room.  consulted with ED staff about the issue of family wanting to view the body later.  was told that the family could come back up and view the body in Oncology with advance notice. Family was coming from [place] and [address] [place] [address].  gave Qasim Krishnamurthy the phone number for the nursing supervisor to make arrangements.  escorted Alana Messer and Qasim Krishnamurthy out of the hospital.  provided pastoral care, support, grief support and compassionate presence.  comforted Alana Messer through out the morning.   will notify nursing supervisor and put a note on shift report for chaplains to now what is happening in the encounter. Spiritual Care will support the family when they return to the hospital. Jurgen Davila and Sohail Carrasquillo of chaplains availability. MD Gregoriaiv Pager: [phone]

## 2019-04-09 NOTE — ED PROVIDER NOTES
EMERGENCY DEPARTMENT HISTORY AND PHYSICAL EXAM 
 
 
Date: 4/9/2019 Patient Name: Chan Ruvalcaba History of Presenting Illness Chief Complaint Patient presents with  Cardiac arrest  
 
 
History Provided By: Patient's Sister and EMS 
 
HPI: Balbir Rogel, 26 y.o. male with PMHx significant for Down syndrome presents in cardiac arrest after call was placed in 911 by his sister at home where they live. Patient sister and EMS related the patient was in bed eating a pancake where the sister found him unresponsive and remove pancake from his mouth. EMS was called and upon finding the patient, removed additional pancakes from his throat prior to intubation. Initiated compressions and ACLS procedures in the field and transported the patient to the emergency department. EMS reported on arrival the patient's rhythm had been asystole despite multiple rounds of epi and compressions and bag valve mask ventilation via ET tube. Patient sister relays he had a single dose of 2 mg of oral Ativan, no other medications this morning and had not recently been ill. There are no other complaints, changes, or physical findings at this time. PCP: Naye Le MD 
 
No current facility-administered medications on file prior to encounter. Current Outpatient Medications on File Prior to Encounter Medication Sig Dispense Refill  LORazepam (ATIVAN) 2 mg tablet Take 1/2 or 1  Q 6 hours PRN Agitation 60 Tab 5  
 traZODone (DESYREL) 50 mg tablet Take 1 Tab by mouth nightly. 30 Tab 5  cholecalciferol, VITAMIN D3, (VITAMIN D3) 5,000 unit tab tablet Take 1 Tab by mouth daily. 90 Tab 1  
 LORazepam (ATIVAN) 0.5 mg tablet 1/2 or 1 po q 4 hours prn agitation, may refill monthly 40 Tab 5  
 triamcinolone acetonide (KENALOG) 0.1 % ointment Apply  to affected area two (2) times a day. use thin layer, to dry, red, itchy areas of skin 30 g 2 Past History Past Medical History: 
Past Medical History: Diagnosis Date  Constipation 7/28/2015  downs syndrome  HX OTHER MEDICAL   
 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 
Past Surgical History:  
Procedure Laterality Date  HX OTHER SURGICAL    
 sister states that he had a cecostomy years ago and it was reversed Family History: 
Family History Problem Relation Age of Onset  Hypertension Mother  Asthma Maternal Grandmother Social History: 
Social History Tobacco Use  Smoking status: Never Smoker  Smokeless tobacco: Never Used Substance Use Topics  Alcohol use: No  
 Drug use: No  
 
 
Allergies: Allergies Allergen Reactions  Milk Other (comments) Mother states that he has a reaction to dairy, diarrhea, gas, and vomiting.  Pork Derived (Porcine) Other (comments) Mother states that he has a reaction to pork including diarrhea, gas, and vomiting.  Tomato Other (comments) Intolerance, stomach upset Review of Systems Review of Systems Unable to perform ROS: Patient unresponsive Physical Exam  
Physical Exam  
Vital signs and nursing notes reviewed CONSTITUTIONAL: Unresponsive, currently being ventilated via ET tube and bag valve mask. HEAD:  Normocephalic, atraumatic EYES: No spontaneous eye movement. ENTM: Nose: no rhinorrhea; ET tube in place. Mist in the tube, positive capnography Neck:  Supple. trachea is midline. RESP: Bilateral breath sounds with bag valve mask ventilation, slightly decreased on the left side. CV: S1 and S2 WNL; No murmurs, gallops or rubs. 2+ radial and DP pulses bilaterally. GI: non-distended, normal bowel sounds, abdomen soft and non-tender. No masses or organomegaly. No air auscultated over the stomach on ventilation. : No costo-vertebral angle tenderness. BACK:  Non-tender, normal appearance UPPER EXT:  Normal inspection. no joint or soft tissue swelling LOWER EXT: No edema, no calf tenderness. NEURO: Not awake not alert SKIN: No rashes; Warm and dry PSYCH: Not conscious, not talking. Diagnostic Study Results Labs - No results found for this or any previous visit (from the past 12 hour(s)). Radiologic Studies - No orders to display CT Results  (Last 48 hours) None CXR Results  (Last 48 hours) None Medical Decision Making I am the first provider for this patient. I reviewed the vital signs, available nursing notes, past medical history, past surgical history, family history and social history. Vital Signs-Reviewed the patient's vital signs. No data found. Records Reviewed: Nursing Notes, Old Medical Records and Ambulance Run Sheet Provider Notes (Medical Decision Making):  
82-year-old male presenting in cardiac arrest.  Suspicion for airway occlusion leading to respiratory arrest, leading to cardiopulmonary arrest.  Despite multiple rounds of ACLS in the field and continued efforts at resuscitation of this patient in the emergency department, bedside ultrasound reveals that there is no cardiac wall motion. There is no evidence of trauma or foul play. Have contacted the medical examiner. Sister, next of kin has been notified and is here in the emergency department. ED Course:  
Initial assessment performed. The patients presenting problems have been discussed, and they are in agreement with the care plan formulated and outlined with them. I have encouraged them to ask questions as they arise throughout their visit. ED Course as of Apr 09 1221 Tue Apr 09, 2019  
1103 Spoke to medical examiner's office. Patient is an ME case where the physician will come to the hospital and examined the patient in the hospital's morgue. I clarified with the E investigator that the sister, who is next of kin, can in fact see her brother prior to this examination. [TL] ED Course User Index [TL] Hermilo Otero MD  
 
 
Critical Care Time:  
 CRITICAL CARE NOTE : 
 
12:18 PM 
 
 
IMPENDING DETERIORATION -Cardiovascular ASSOCIATED RISK FACTORS - Dysrhythmia MANAGEMENT- Bedside Assessment and Supervision of Care, supervising ACLS and CPR INTERPRETATION -  Xrays, Blood Pressure and Cardiac Output Measures INTERVENTIONS -ACLS medications CASE REVIEW - Nursing and Family and medical examiner TREATMENT RESPONSE -Unchanged PERFORMED BY - Self Ultrasound performed at 10:41 AM at the bedside Preprocedure diagnosis: Cardiopulmonary arrest 
Post procedure diagnosis: cardio pulmonary arrest 
Subxiphoid and parasternal views of the heart show no cardiac wall motion. No visible pericardial effusion. 10:42 AM 
Resuscitation was halted at 10:42 AM.  A moment of silence was provided for the patient. NOTES   : 
 
 
I have spent 37 minutes of critical care time involved in lab review, consultations with specialist, family decision- making, bedside attention and documentation. During this entire length of time I was immediately available to the patient . Macho Sidhu MD 
 
Disposition: 
 Diagnosis Clinical Impression: 1. Cardiac arrest ([address] 2. Respiratory arrest ([address]

## 2019-04-09 NOTE — ED NOTES
Assumed care from EMS. Patient was found unresponsive at home with his sister. Patient was eating Pancakes and began to choke. Patient had taken 2 mg of Ativan. Patient had a history of Down Syndrome. When EMS arrived, law enforcement had began CPR. EMS gave 7 rounds of Epi, and an amp of bicarb. Patient had an IO Right humerhead, glucose was 204 and patient was intubated with a 7.5 tube and 22 at the teeth. On arrival, patient was in PEA and actively doing compressions.